# Patient Record
Sex: MALE | Race: WHITE
[De-identification: names, ages, dates, MRNs, and addresses within clinical notes are randomized per-mention and may not be internally consistent; named-entity substitution may affect disease eponyms.]

---

## 2019-04-10 ENCOUNTER — HOSPITAL ENCOUNTER (INPATIENT)
Dept: HOSPITAL 74 - JER | Age: 74
LOS: 6 days | Discharge: HOME | DRG: 897 | End: 2019-04-16
Attending: INTERNAL MEDICINE | Admitting: INTERNAL MEDICINE
Payer: COMMERCIAL

## 2019-04-10 VITALS — BODY MASS INDEX: 30.1 KG/M2

## 2019-04-10 DIAGNOSIS — R50.9: ICD-10-CM

## 2019-04-10 DIAGNOSIS — G47.30: ICD-10-CM

## 2019-04-10 DIAGNOSIS — R25.1: ICD-10-CM

## 2019-04-10 DIAGNOSIS — K21.9: ICD-10-CM

## 2019-04-10 DIAGNOSIS — E11.22: ICD-10-CM

## 2019-04-10 DIAGNOSIS — M17.11: ICD-10-CM

## 2019-04-10 DIAGNOSIS — K70.30: ICD-10-CM

## 2019-04-10 DIAGNOSIS — I12.9: ICD-10-CM

## 2019-04-10 DIAGNOSIS — M84.48XA: ICD-10-CM

## 2019-04-10 DIAGNOSIS — E11.21: ICD-10-CM

## 2019-04-10 DIAGNOSIS — E66.9: ICD-10-CM

## 2019-04-10 DIAGNOSIS — F17.210: ICD-10-CM

## 2019-04-10 DIAGNOSIS — E78.5: ICD-10-CM

## 2019-04-10 DIAGNOSIS — D61.818: ICD-10-CM

## 2019-04-10 DIAGNOSIS — R16.1: ICD-10-CM

## 2019-04-10 DIAGNOSIS — R56.9: ICD-10-CM

## 2019-04-10 DIAGNOSIS — F10.239: Primary | ICD-10-CM

## 2019-04-10 DIAGNOSIS — N18.9: ICD-10-CM

## 2019-04-10 LAB
ALBUMIN SERPL-MCNC: 3.4 G/DL (ref 3.4–5)
ALP SERPL-CCNC: 182 U/L (ref 45–117)
ALT SERPL-CCNC: 35 U/L (ref 13–61)
ANION GAP SERPL CALC-SCNC: 8 MMOL/L (ref 8–16)
APPEARANCE UR: CLEAR
AST SERPL-CCNC: 104 U/L (ref 15–37)
BACTERIA #/AREA URNS HPF: 2 /HPF
BASOPHILS # BLD: 0.3 % (ref 0–2)
BILIRUB SERPL-MCNC: 0.9 MG/DL (ref 0.2–1)
BILIRUB UR STRIP.AUTO-MCNC: NEGATIVE MG/DL
BUN SERPL-MCNC: 17 MG/DL (ref 7–18)
CALCIUM SERPL-MCNC: 8.2 MG/DL (ref 8.5–10.1)
CASTS #/AREA URNS LPF: 1 /HPF (ref 0–8)
CHLORIDE SERPL-SCNC: 104 MMOL/L (ref 98–107)
CO2 SERPL-SCNC: 26 MMOL/L (ref 21–32)
COLOR UR: YELLOW
CREAT SERPL-MCNC: 2.4 MG/DL (ref 0.55–1.3)
DEPRECATED RDW RBC AUTO: 19.3 % (ref 11.9–15.9)
EOSINOPHIL # BLD: 0.3 % (ref 0–4.5)
EPITH CASTS URNS QL MICRO: 1 /HPF
GLUCOSE SERPL-MCNC: 115 MG/DL (ref 74–106)
HCT VFR BLD CALC: 33.3 % (ref 35.4–49)
HGB BLD-MCNC: 11.1 GM/DL (ref 11.7–16.9)
KETONES UR QL STRIP: (no result)
LEUKOCYTE ESTERASE UR QL STRIP.AUTO: NEGATIVE
LYMPHOCYTES # BLD: 22.6 % (ref 8–40)
MAGNESIUM SERPL-MCNC: 1.7 MG/DL (ref 1.8–2.4)
MCH RBC QN AUTO: 29.9 PG (ref 25.7–33.7)
MCHC RBC AUTO-ENTMCNC: 33.3 G/DL (ref 32–35.9)
MCV RBC: 89.7 FL (ref 80–96)
MONOCYTES # BLD AUTO: 8.8 % (ref 3.8–10.2)
NEUTROPHILS # BLD: 68 % (ref 42.8–82.8)
NITRITE UR QL STRIP: NEGATIVE
PH UR: 6 [PH] (ref 5–8)
PLATELET # BLD AUTO: 61 K/MM3 (ref 134–434)
PMV BLD: 8.2 FL (ref 7.5–11.1)
POTASSIUM SERPLBLD-SCNC: 4 MMOL/L (ref 3.5–5.1)
PROT SERPL-MCNC: 7.3 G/DL (ref 6.4–8.2)
PROT UR QL STRIP: (no result)
PROT UR QL STRIP: NEGATIVE
RBC # BLD AUTO: 1 /HPF (ref 0–4)
RBC # BLD AUTO: 3.72 M/MM3 (ref 4–5.6)
SODIUM SERPL-SCNC: 138 MMOL/L (ref 136–145)
SP GR UR: 1.01 (ref 1.01–1.03)
UROBILINOGEN UR STRIP-MCNC: 0.2 MG/DL (ref 0.2–1)
WBC # BLD AUTO: 3 K/MM3 (ref 4–10)
WBC # UR AUTO: 1 /HPF (ref 0–5)

## 2019-04-10 PROCEDURE — HZ2ZZZZ DETOXIFICATION SERVICES FOR SUBSTANCE ABUSE TREATMENT: ICD-10-PCS

## 2019-04-10 RX ADMIN — INSULIN ASPART SCH: 100 INJECTION, SOLUTION INTRAVENOUS; SUBCUTANEOUS at 22:53

## 2019-04-10 RX ADMIN — HEPARIN SODIUM SCH UNIT: 5000 INJECTION, SOLUTION INTRAVENOUS; SUBCUTANEOUS at 22:23

## 2019-04-10 NOTE — PDOC
Attending Attestation





- HPI


HPI: 





04/10/19 17:02


The patient is a 73-year-old male, with a past medical history of HTN, HLD, DM, 

acid reflux, seizures, who presents to the ED for alcohol withdrawal. Wife is 

at bedside and states that the  patient has been drinking around 2 bottles of 

wine daily for the past month, but stopped drinking 2 days ago. He is currently 

involved in an outpatient detox program, but has not been attending for a month 

now. Patient is now experiencing tremors. Patient is a poor historian, wife is 

providing history.





The patient denies any fevers, chills, nausea, vomiting, diarrhea, or abdominal 

pain. Denies any chest pain, palpitations, or shortness of breath. 





Allergies: NKA


Surgical History: B/L inguinal hernia repair. 


Social History: Alcohol abuse. Denies tobacco or drug use. 


PCP is in the [place]. 








- Physicial Exam


PE: 





04/10/19 17:04


GENERAL: (+)Tremulous. Patient is a poor historian, wife is providing the 

history. Awake, alert, and fully oriented, in no acute distress


HEAD: No signs of trauma


EYES: PERRLA, EOMI, sclera anicteric, conjunctiva clear


ENT: (+)Tongue fasciculations. Auricles normal inspection, hearing grossly 

normal, nares patent, oropharynx clear without 


exudates. Moist mucosa


NECK: Normal ROM, supple, no lymphadenopathy, JVD, or masses


LUNGS: Breath sounds equal, clear to auscultation bilaterally.  No wheezes, and 

no crackles


HEART:  (+)Tachy. Normal S1 and S2, no murmurs, rubs or gallops


ABDOMEN: Soft, nontender, normoactive bowel sounds.  No guarding, no rebound.  

No masses


EXTREMITIES: Pedal and radial pulses are intact. Normal range of motion, no 

edema.  No clubbing or cyanosis. No cords, erythema, or tenderness


NEUROLOGICAL: Cranial nerves II through XII grossly intact.  Normal speech.


SKIN: Warm, Dry, normal turgor, no rashes or lesions noted











<Shandra Levin - Last Filed: 04/10/19 17:02>





- Resident


Resident Name: Camila Mayes





- ED Attending Attestation


I have performed the following: I have examined & evaluated the patient, The 

case was reviewed & discussed with the resident, I agree w/resident's findings 

& plan, Exceptions are as noted





- Medical Decision Making





04/10/19 16:07








I, Dr. Lili Melgar, DO, attest that this document has been prepared under 

my direction and personally reviewed by me in its entirety.   I further attest, 

that it accurately reflects all work, treatment, procedures and medical decision

-making performed by me.  


04/10/19 16:33


a/p: 74yo male with hx of alcohol abuse who has been drinking wine 1-2 bottles 

daily x 1 month


-stopped drinking 2 days ago, today with tongue fasciculations/tremors


-no external signs of trauma, denies falls


-has outpt detox program that he can return to after he detoxes from alcohol


-pt tachy, active withdrawal


-will check labs, ekg, cxr, mag


-will give thiamine and folate, mvi


04/10/19 16:35


pt PMD is in the [facility]


04/10/19 18:44


resident discussed the case with SYMPHONY who accepts pt to service


04/10/19 18:44


mag low, will replace


pt has a nephrologist, no prior cr, ivf hydration running


04/10/19 18:44


pt still with mild tongue fasciculations and tremors, but improved





<Lili Melgar - Last Filed: 04/10/19 18:45>





**Heart Score/ECG Review





- ECG Intrepretation


Comment:: 





04/10/19 17:18


sinus at 97, nl axis, nl interval, no acute st/t wave finding





<Lili Melgar - Last Filed: 04/10/19 18:45>





Attestations





- Attestations





04/10/19 17:09





Documentation prepared by Shandra Levin, acting as medical scribe for Lili Melgar DO.





<Shandra Levin - Last Filed: 04/10/19 17:02>

## 2019-04-10 NOTE — PN
Teaching Attending Note


Name of Resident: Roel Briones





ATTENDING PHYSICIAN STATEMENT





I saw and evaluated the patient.


I reviewed the resident's note and discussed the case with the resident.


I agree with the resident's findings and plan as documented.








SUBJECTIVE:





Patient is a 73-year-old man with a PMH of HTN, Tobacco use, HLD, DM, acid 

reflux, inguinal hernai repair and seizures, who presents to the ED for alcohol 

withdrawal. Was sent by his neurologist who noticed the tremors. Wife is at 

bedside and states that the patient has been drinking around 2 bottles of wine 

daily for the past month, but stopped drinking 2 days ago. He is currently 

involved in an outpatient detox program, but has not been attending for a month 

now. Patient is now experiencing tremors. Patient is a poor historian, wife is 

providing history. The patient denies any fevers, chills, nausea, vomiting, 

diarrhea, or abdominal pain. Denies any chest pain, palpitations, or shortness 

of breath. 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





OBJECTIVE:


Alert and tremulous


 Vital Signs











 Period  Temp  Pulse  Resp  BP Sys/Nieves  Pulse Ox


 


 Last 24 Hr  98.6 F-98.8 F  91-98  16-20  144-147/72-77  96-97








HEENT: No Jaundice, eye redness or discharge, PERRLA, EOMI. Tongue 

fasciculations. Normocephalic, atraumatic. External ears are normal and hearing 

is grossly intact. No nasal discharge.


Neck: Supple, nontender. No palpable adenopathy or thyromegaly. No JVD


Chest: Good effort. Clear to auscultation and percussion.


Heart: Regular. No S3, rub or murmur


Abdomen: Not distended, soft, nontender and no HSM. No rebound or guarding.  

Normal bowel sounds.


Ext: Peripheral pulses intact. No leg edema.


Skin: Warm and dry. No petechiae, rash or ecchymosis.


Neuro: Alert. Oriented x3. CN 2-12 grossly intact. Tremors. Sensation grossly 

intact in all four extremities and DTR are symmetric.


Psych: Appropriate mood and affect. Good insight.


 Current Medications











Generic Name Dose Route Start Last Admin





  Trade Name Freq  PRN Reason Stop Dose Admin


 


Amlodipine Besylate  2.5 mg  04/11/19 10:00  





  Norvasc -  PO   





  DAILY SAMUEL   





     





     





     





     


 


Atorvastatin Calcium  20 mg  04/10/19 22:00  





  Lipitor -  PO   





  HS On license of [facility]   





     





     





     





     


 


Folic Acid  1 mg  04/11/19 10:00  





  Folic Acid -  PO   





  DAILY On license of [facility]   





     





     





     





     


 


Heparin Sodium (Porcine)  5,000 unit  04/10/19 22:00  





  Heparin -  SQ   





  TID On license of [facility]   





     





     





     





     


 


Folic Acid 1 mg/ Thiamine HCl  1,000 mls @ 125 mls/hr  04/10/19 16:03  04/10/19 

17:00





100 mg/ Multivitamins/Minerals  IVPB  04/11/19 00:02  125 mls/hr





10 ml/ Sodium Chloride  ONCE ONE   Administration





     





     





     





     


 


Insulin Aspart  1 vial  04/10/19 22:00  





  Novolog Vial Sliding Scale -  SQ   





  ACHS On license of [facility]   





     





     





  Protocol   





     


 


Lorazepam  0.5 mg  04/12/19 05:00  





  Ativan -  PO  04/13/19 05:01  





  Q6H On license of [facility]   





     





     





     





     


 


Lorazepam  0.5 mg  04/12/19 05:00  





  Ativan -  PO  04/13/19 05:00  





  Q4H PRN   





  Symptoms of Withdrawal   





     





     





     


 


Lorazepam  1 mg  04/11/19 05:00  





  Ativan -  PO  04/11/19 23:01  





  0500,1100,1700,2300 On license of [facility]   





     





     





     





     


 


Lorazepam  1 mg  04/10/19 19:24  





  Ativan -  PO  04/12/19 05:00  





  Q4H PRN   





  Symptoms of Withdrawal   





     





     





     


 


Metoprolol Succinate  50 mg  04/11/19 10:00  





  Toprol Xl -  PO   





  DAILY On license of [facility]   





     





     





     





     


 


Non-Formulary Medication  40 mg  04/11/19 10:00  





  Esomeprazole Magnesium [Esomeprazole Magnesium]  PO   





  DAILY On license of [facility]   





     





     





     





     


 


Non-Formulary Medication  600 mg  04/11/19 10:00  





  Gabapentin [Gabapentin]  PO   





  DAILY On license of [facility]   





     





     





     





     


 


Non-Formulary Medication  1 each  04/11/19 10:00  





  Lipase/Protease/Amylase [Creon Dr 24,000 Units Capsule]  PO   





  DAILY On license of [facility]   





     





     





     





     


 


Non-Formulary Medication  20 mg  04/11/19 10:00  





  Tadalafil [Cialis]  PO   





  DAILY On license of [facility]   





     





     





     





     


 


Non-Formulary Medication  10 mg  04/10/19 22:00  





  Zolpidem Tartrate [Ambien]  PO   





  HS On license of [facility]   





     





     





     





     


 


Spironolactone  25 mg  04/11/19 10:00  





  Aldactone -  PO   





  DAILY On license of [facility]   





     





     





     





     


 


Thiamine HCl  100 mg  04/11/19 10:00  





  Vitamin B1 -  PO   





  DAILY On license of [facility]   





     





     





     





     








 Home Medications











 Medication  Instructions  Recorded


 


Amlodipine Besylate 2.5 mg PO DAILY 04/10/19


 


Atorvastatin Ca [Lipitor] 20 mg PO HS 04/10/19


 


Esomeprazole Magnesium 40 mg PO DAILY 04/10/19


 


Gabapentin 600 mg PO DAILY 04/10/19


 


Insulin Degludec [Tresiba] 40 unit SQ BID 04/10/19


 


Lipase/Protease/Amylase [Creon Dr 1 each PO DAILY 04/10/19





24,000 Units Capsule]  


 


Liraglutide [Victoza -] 1.8 mg SQ DAILY@0700 04/10/19


 


Metformin HCl [Metformin HCl ER] 500 mg PO QID 04/10/19


 


Metoprolol Succinate [Toprol Xl] 50 mg PO DAILY 04/10/19


 


Spironolactone 25 mg PO DAILY 04/10/19


 


Tadalafil [Cialis] 20 mg PO DAILY 04/10/19


 


Zolpidem Tartrate [Ambien] 10 mg PO HS 04/10/19








 Abnormal Lab Results











  04/10/19 04/10/19





  16:43 16:43


 


WBC  3.0 L 


 


RBC  3.72 L 


 


Hgb  11.1 L 


 


Hct  33.3 L 


 


RDW  19.3 H 


 


Plt Count  61 L 


 


Creatinine   2.4 H


 


Random Glucose   115 H


 


Calcium   8.2 L


 


Magnesium   1.7 L


 


AST   104 H


 


Alkaline Phosphatase   182 H


 


Serum Folate   33 H








ASSESSMENT AND PLAN:


1. Alcohol withdrawal syndrome - Implement CIWA ativan alcohol withdrawal 

protocol. Do neurocheaks and implement seizure, fall and aspiration 

precautions. Treat with thiamine and folic acid and monitor electrolytes (Ca,Mg,

K,P). Counseled patient about abstaining from alcohol and will refer to alcohol 

detox upon discharge. Consult Addiction specialist. Getting banana bag in the 

ER.





EKG is NSR with not ST-T wave changes. CXR shows mild cardiomegaly with right 

hilar prominence and a RUL pleural based mass. Will get a Chest CT in view of 

his risk factors for lung cancer.





2. DM  For now, we will hold the home diabetes drugs and implement sliding 

scale insulin regimen. Provide comprehensive diabetes care with patient 

teaching and counseling about the importance of adherence to prescribed 

diabetes regimen, euglycemia, eye care and foot care.





3. Tobacco Use  Counseled on risks associated with tobacco use.  We will 

provide patient all the necessary assistance to facilitate smoking cessation 

and prescribe Nicotine patch.





4. CKD - Has risk factors for CKD. Says he sees a nephrologist in the [place]. 

Will get urinalysis, a kidney sonogram, PTH, phosphate and hydrate gently. 

Consult nephrology and avoid nephrotoxic agents such as NSAIDS, aminoglycosides

, contrast dyes and certain Alternative medicine products.





5. Pancytopenia - Likely due to effects of alcohol. Do basic anemia work up 

including serial stool guaiacs, reticulocyte count and iron studies. Monitor 

platelets daily. Abdominal sonogram pending.





6. Obesity  Counseled on the risks associated with obesity. Will provide 

patient all the necessary assistance, counseling and positive reinforcement to 

facilitate weight loss. Consult dietician.





7. Hypertension - Restart outpatient antihypertensive drugs and revise regimen 

to ensure smooth round-the-clock good BP control.  Nonpharmacologic measures to 

control hypertension like weight loss, salt restriction and exercise discussed.





8. DVT prophylaxis - Heparin 5000u sq tid; SCDs, TEDs (Monitor platelets daily 

and if it drops lower than the current level, will hold SQ heparin).   





9. Advance directives - Full code

## 2019-04-10 NOTE — PDOC
History of Present Illness





- General


Chief Complaint: Alcohol intoxication


Stated Complaint: PATIENT HERE FOR WITHDRAWAL


Time Seen by Provider: 04/10/19 15:59


History Source: Patient


Exam Limitations: No Limitations





- History of Present Illness


Initial Comments: 





04/10/19 16:01


73 year old man with a history of alcohol abuse, sleep apnea, DM2, HTN, alcohol 

withdrawal seizures, last detox Dec 2018 who presents with  withdrawal after 

drinking 1-2 bottles of wine per day for approx one month and last drink 2 days 

ago. The patient is followed by outpatient detox at [facility]. 

The patient denies any headache, chest pain, n/v/d/c, fever. Denies any recent 

seizures. 





PCP: Dr. Burleson











Past History





- Past Medical History


Allergies/Adverse Reactions: 


 Allergies











Allergy/AdvReac Type Severity Reaction Status Date / Time


 


No Known Allergies Allergy   Verified 06/11/14 21:18











Home Medications: 


Ambulatory Orders





Amlodipine Besylate 2.5 mg PO DAILY 04/10/19 


Atorvastatin Ca [Lipitor] 20 mg PO HS 04/10/19 


Ciclopirox Olamine [Ciclopirox] 15 gm TP DAILY 04/10/19 


Clotrimazole/Betamethasone Dip [Clotrimazole-Betamethasone Crm] 45 gm TP DAILY 

04/10/19 


Esomeprazole Magnesium 40 mg PO DAILY 04/10/19 


Gabapentin 600 mg PO DAILY 04/10/19 


Insulin Degludec [Tresiba] 180 unit SQ DAILY 04/10/19 


Lipase/Protease/Amylase [Creon Dr 24,000 Units Capsule] 1 each PO DAILY 04/10/

19 


Liraglutide [Victoza -] 1.8 mg SQ DAILY@0700 04/10/19 


Metformin HCl [Metformin HCl ER] 500 mg PO QID 04/10/19 


Metoprolol Succinate [Toprol Xl] 50 mg PO DAILY 04/10/19 


Spironolactone 25 mg PO DAILY 04/10/19 


Tadalafil [Cialis] 20 mg PO DAILY 04/10/19 


Zolpidem Tartrate [Ambien] 10 mg PO HS 04/10/19 








Anemia: No


Asthma: No


Cancer: No


Cardiac Disorders: No


CVA: No


COPD: No


CHF: No


Dementia: No


Diabetes: Yes (on jnuvia)


GI Disorders: Yes (acid reflux disease)


 Disorders: No


HTN: Yes (on medication)


Hypercholesterolemia: Yes (on lipitor 20 mgs po hs)


Kidney Stones: No


Liver Disease: No


Seizures: Yes (2 years ago)


Thyroid Disease: No





- Surgical History


Abdominal Surgery: Yes (bilat inguinal hernia repair age age of 10 years old amd 

16 years old)





- Immunization History


Immunization Up to Date: No





- Suicide/Smoking/Psychosocial Hx


Smoking History: Never smoked


Have you smoked in the past 12 months: No


Information on smoking cessation initiated: No


Hx Alcohol Use: Yes (2 DAYS AGO)


Drug/Substance Use Hx: No


Substance Use Type: Alcohol


Hx Substance Use Treatment: Yes (2012)





**Review of Systems





- Review of Systems


Able to Perform ROS?: Yes


Comments:: 





04/10/19 17:25


GENERAL/CONSTITUTIONAL: No fever or chills. No weakness.


HEAD, EYES, EARS, NOSE AND THROAT: No change in vision. No ear pain or 

discharge. No sore throat.


CARDIOVASCULAR: No chest pain or shortness of breath


RESPIRATORY: No cough, wheezing, or hemoptysis.


GASTROINTESTINAL: No nausea, vomiting, diarrhea or constipation.


GENITOURINARY: No dysuria, frequency, or change in urination.


MUSCULOSKELETAL: No joint or muscle swelling or pain. No neck or back pain.


SKIN: No rash


NEUROLOGIC: No headache, vertigo, loss of consciousness, or change in strength/

sensation.


ENDOCRINE: No increased thirst. No abnormal weight change


HEMATOLOGIC/LYMPHATIC: No anemia, easy bleeding, or history of blood clots.


ALLERGIC/IMMUNOLOGIC: No hives or skin allergy.








Is the patient limited English proficient: No





*Physical Exam





- Vital Signs


 Last Vital Signs











Temp Pulse Resp BP Pulse Ox


 


 98.6 F   91 H  16   144/72   97 


 


 04/10/19 14:06  04/10/19 14:06  04/10/19 14:06  04/10/19 14:06  04/10/19 14:06














- Physical Exam


Comments: 





04/10/19 17:24


GENERAL: Awake, alert, and fully oriented, in no acute distress


HEAD: No signs of trauma, normocephalic, atraumatic 


EYES: EOMI, sclera anicteric, conjunctiva clear


ENT: + tongue fasciculations, oropharynx clear without exudates. Moist mucosa


NECK: Normal ROM, supple


LUNGS: No distress, speaks full sentences, clear to auscultation bilaterally 


HEART: Regular rate and rhythm, normal S1 and S2, no murmurs, rubs or gallops, 

peripheral pulses normal and equal bilaterally. 


ABDOMEN: Soft, nontender, normoactive bowel sounds.  No guarding, no rebound.  

No masses


EXTREMITIES : Normal inspection, Normal range of motion, no edema.  No clubbing 

or cyanosis. 


NEUROLOGICAL: Cranial nerves II through XII grossly intact.  Normal speech, no 

focal sensorimotor deficits + hand tremors


SKIN: Warm, Dry, normal turgor, no rashes or lesions noted











ED Treatment Course





- LABORATORY


CBC & Chemistry Diagram: 


 04/10/19 16:43





 04/10/19 16:43





Medical Decision Making





- Medical Decision Making





04/10/19 17:21


73 year old man with a history of alcohol abuse, sleep apnea, DM2, HTN, alcohol 

withdrawal seizures, last detox Dec 2018 who presents with  withdrawal after 

drinking 1-2 bottles of wine per day for approx one month and last drink 2 days 

ago.





ED Course:


presenting with withdarwal symptosm seeking detox. 


will evalaute for hypoglycemia, electrolyte derange, ekg changes 





EKG: normal sinus 97bpm, short FL 110ms, no interval abnormalities, narrow QRS, 

ST and T wave segments and morphology normal.





pending labwork





04/10/19 18:22


Patient reassessed, still with hand tremors and tongue fisiculations


cbc, cmp wnl





Cr elevatd no prior lab value, the patient follows with a nephorlogist 





04/10/19 18:33


Discusssed case with inapatient medicine team .





CXR read as R pleural mass


will order CT chest 











*DC/Admit/Observation/Transfer


Diagnosis at time of Disposition: 


 Alcohol withdrawal








- Discharge Dispostion


Condition at time of disposition: Stable


Decision to Admit order: Yes





- Referrals


Referrals: 


ON STAFF,NOT [Primary Care Provider] - 





- Patient Instructions





- Post Discharge Activity

## 2019-04-10 NOTE — HP
CHIEF COMPLAINT: detox





HISTORY OF PRESENT ILLNESS:





73 year old male hx alcohol abuse, sleep apnea, non-insulin dependent diabetes, 

HTN, alcohol withdrawal seizure (only one in the past, last seizure 3 years ago)

, and multiple detox admissions (2018) presents for detox sent by one of his 

outpatient physicians. He reports that his last drink was 2 days ago and that 

he can drink up to 1 pint in a day. He does not want to go to his normal 

outpatient detox facility and does not wish to go to [facility] for detox. 

Patient reports withdrawal symptoms being tremors in his fingers and tongue 

fasciculations. He reports that he currently feels at ease, denies chest pain, 

shortness of breath, palpitations, abdominal pain, fevers, chills, headache, 

nausea, vomiting, diarrhea. Reports he sees a nephrologist Dr. Haile Del Cid in 

the [place] ([phone]) for a "kidney problem" but is unable to tell me his 

baseline creatinine or what his kidney disorder is. Reports that he sees a 

liver doctor as well, but was unable to tell me his information at the time of 

exam.





ER course was notable for:


(1) /72


(2) pancytopenia


(3) Cre 2.4





Recent Travel: denies





PAST MEDICAL HISTORY: alcohol abuse, sleep apnea, non-insulin dependent diabetes

, HTN, alcohol withdrawal seizure





PAST SURGICAL HISTORY: hernia repair as a child





Social History:


Smoking: former, quit 30 years ago, smoked since he was 14 1 ppd


Alcohol: heavy alcohol user for many years, last drink 2 days ago


Drugs: denies





Family History: father and 2 brothers  of lung CA, mother had alzheimers, 

children and grandchildren are all healthy


Occupation: former  exposed to asbestos





Allergies





No Known Allergies Allergy (Verified 14 21:18)


 








HOME MEDICATIONS:


 Home Medications











 Medication  Instructions  Recorded


 


Amlodipine Besylate 2.5 mg PO DAILY 04/10/19


 


Atorvastatin Ca [Lipitor] 20 mg PO HS 04/10/19


 


Esomeprazole Magnesium 40 mg PO DAILY 04/10/19


 


Gabapentin 600 mg PO DAILY 04/10/19


 


Insulin Degludec [Tresiba] 40 unit SQ BID 04/10/19


 


Lipase/Protease/Amylase [Remigio Dr 1 each PO DAILY 04/10/19





24,000 Units Capsule]  


 


Liraglutide [Victoza -] 1.8 mg SQ DAILY@0700 04/10/19


 


Metformin HCl [Metformin HCl ER] 500 mg PO QID 04/10/19


 


Metoprolol Succinate [Toprol Xl] 50 mg PO DAILY 04/10/19


 


Spironolactone 25 mg PO DAILY 04/10/19


 


Tadalafil [Cialis] 20 mg PO DAILY 04/10/19


 


Zolpidem Tartrate [Ambien] 10 mg PO HS 04/10/19








REVIEW OF SYSTEMS


CONSTITUTIONAL: 


Absent:  fever, chills, diaphoresis, generalized weakness, malaise, loss of 

appetite, weight change


HEENT: 


Absent:  rhinorrhea, nasal congestion, throat pain, throat swelling, difficulty 

swallowing, mouth swelling, ear pain, eye pain, visual changes


CARDIOVASCULAR: 


Absent: chest pain, syncope, palpitations, irregular heart rate, lightheadedness

, peripheral edema


RESPIRATORY: 


Absent: cough, shortness of breath, dyspnea with exertion, orthopnea, wheezing, 

stridor, hemoptysis


GASTROINTESTINAL:


Absent: abdominal pain, abdominal distension, nausea, vomiting, diarrhea, 

constipation, melena, hematochezia


GENITOURINARY: 


Absent: dysuria, frequency, urgency, hesitancy, hematuria, flank pain, genital 

pain


MUSCULOSKELETAL: 


Absent: myalgia, arthralgia, joint swelling, back pain, neck pain


SKIN: 


Absent: rash, itching, pallor


HEMATOLOGIC/IMMUNOLOGIC: 


Absent: easy bleeding, easy bruising, lymphadenopathy, frequent infections


ENDOCRINE:


Absent: unexplained weight gain, unexplained weight loss, heat intolerance, 

cold intolerance


NEUROLOGIC: tremor


Absent: headache, focal weakness or paresthesias, dizziness, unsteady gait, 

seizure, mental status changes, bladder or bowel incontinence


PSYCHIATRIC: 


Absent: anxiety, depression, suicidal or homicidal ideation, hallucinations.








PHYSICAL EXAMINATION


 Vital Signs - 24 hr











  04/10/19 04/10/19 04/10/19





  14:06 19:23 19:26


 


Temperature 98.6 F  98.8 F


 


Pulse Rate 91 H  


 


Pulse Rate [   98 H





Radial]   


 


Respiratory 16  20





Rate   


 


Blood Pressure 144/72  


 


Blood Pressure   147/77





[Right Arm]   


 


O2 Sat by Pulse 97 97 96





Oximetry (%)   











GENERAL: A&Ox3, no acute distress


EYES: PERRLA, EOMI


ENT: Moist mucus membranes, very mild-no tongue fasciculation, no pharyngeal 

erythema


NECK: No JVD, no lymphadenopathy


LUNGS: CTA, no wheezes


HEART: mildly tachycardic, no murmurs


ABDOMEN: Soft, nontender, BS present


MUSCULOSKELETAL: No CVA Tenderness


EXTREMITIES: 2+ pulses, no edema. 


NEUROLOGICAL:  Cranial nerves II-XII intact. mild hand tremors noted. Motor 

strength 5/5 bilaterally, sensation intact.











 Laboratory Results - last 24 hr











  04/10/19 04/10/19





  16:43 16:43


 


WBC  3.0 L 


 


RBC  3.72 L 


 


Hgb  11.1 L 


 


Hct  33.3 L 


 


MCV  89.7 


 


MCH  29.9 


 


MCHC  33.3 


 


RDW  19.3 H 


 


Plt Count  61 L 


 


MPV  8.2 


 


Absolute Neuts (auto)  2.0 


 


Neutrophils %  68.0 


 


Lymphocytes %  22.6 


 


Monocytes %  8.8 


 


Eosinophils %  0.3 


 


Basophils %  0.3 


 


Nucleated RBC %  0 


 


Sodium   138


 


Potassium   4.0


 


Chloride   104


 


Carbon Dioxide   26


 


Anion Gap   8


 


BUN   17


 


Creatinine   2.4 H


 


Creat Clearance w eGFR   26.82


 


Random Glucose   115 H


 


Calcium   8.2 L


 


Magnesium   1.7 L


 


Total Bilirubin   0.9


 


AST   104 H


 


ALT   35


 


Alkaline Phosphatase   182 H


 


Total Protein   7.3


 


Albumin   3.4


 


Serum Folate   33 H





 





ASSESSMENT/PLAN:





73 year old male hx alcohol abuse, sleep apnea, non-insulin dependent diabetes, 

HTN, alcohol withdrawal seizure (only one in the past, last seizure 3 years ago)

, and multiple detox admissions (2018) presents for detox 





#Alcohol Withdrawal: patient has had last drink 2 days ago with a CIWA of ~1


-will start ativan protocol


-consult detox Dr. Chi


-janelle chavez ordered


-thiamine, folate ordered


-telemetry


-fall precautions


-ativan detox protocol


-LR @ 83cc/hr 1 bag





#Kidney Disease: unclear if acute or chronic at the moment, will need records 

from outpatient nephrologist


-outpatient nephrologist name is Dr. Haile Del Cid at [phone]


-ordered kidney US


-UA ordered


-continue spironolactone





#Pancytopenia: likely 2/2 alcohol use disorder and hepatic congestion/liver 

cirrhosis


-US ordered liver and spleen


-monitor H&H in AM


-may need further workup of pancytopenia


-will need to have meds confirmed in AM - but on creon and esomeprazole





#Pleural Lesion: seen on Xray


-will get non-con CT to evaluate lung further





#DM: sugars are controlled


-will get A1C for records


-sliding scale and levemir 40subQ BID ordered


-held home meds


-BGM ACHS





#Hypertension: mildly hypertensive now, could be as a result of withdrawal


-continue metoprolol succinate 50 daily


-continue spironolactone 25 qd


-continue amlodipine 2.5 qd





#Hyperlipidemia: chronic


-continue statin





#FEN


-LR @ 83cc/hr


-replete mag, recheck lytes in AM


-diabetic diet





#Prophylaxis


-heparin prophylaxis





#Disposition


-admit telemetry





Visit type





- Emergency Visit


Emergency Visit: Yes


ED Registration Date: 04/10/19


Care time: The patient presented to the Emergency Department on the above date 

and was hospitalized for further evaluation of their emergent condition.





- New Patient


This patient is new to me today: Yes


Date on this admission: 04/10/19





- Critical Care


Critical Care patient: No

## 2019-04-11 LAB
ALBUMIN SERPL-MCNC: 3.1 G/DL (ref 3.4–5)
ALP SERPL-CCNC: 169 U/L (ref 45–117)
ALT SERPL-CCNC: 31 U/L (ref 13–61)
ANION GAP SERPL CALC-SCNC: 5 MMOL/L (ref 8–16)
AST SERPL-CCNC: 86 U/L (ref 15–37)
BILIRUB SERPL-MCNC: 0.9 MG/DL (ref 0.2–1)
BUN SERPL-MCNC: 17 MG/DL (ref 7–18)
CALCIUM SERPL-MCNC: 8 MG/DL (ref 8.5–10.1)
CHLORIDE SERPL-SCNC: 107 MMOL/L (ref 98–107)
CO2 SERPL-SCNC: 27 MMOL/L (ref 21–32)
CREAT SERPL-MCNC: 2.1 MG/DL (ref 0.55–1.3)
DEPRECATED RDW RBC AUTO: 19 % (ref 11.9–15.9)
GLUCOSE SERPL-MCNC: 85 MG/DL (ref 74–106)
HCT VFR BLD CALC: 30.1 % (ref 35.4–49)
HGB BLD-MCNC: 10.1 GM/DL (ref 11.7–16.9)
MAGNESIUM SERPL-MCNC: 1.8 MG/DL (ref 1.8–2.4)
MCH RBC QN AUTO: 30.5 PG (ref 25.7–33.7)
MCHC RBC AUTO-ENTMCNC: 33.7 G/DL (ref 32–35.9)
MCV RBC: 90.5 FL (ref 80–96)
PHOSPHATE SERPL-MCNC: 3 MG/DL (ref 2.5–4.9)
PLATELET # BLD AUTO: 50 K/MM3 (ref 134–434)
PMV BLD: 8.7 FL (ref 7.5–11.1)
POTASSIUM SERPLBLD-SCNC: 4 MMOL/L (ref 3.5–5.1)
PROT SERPL-MCNC: 6.7 G/DL (ref 6.4–8.2)
RBC # BLD AUTO: 3.33 M/MM3 (ref 4–5.6)
SODIUM SERPL-SCNC: 139 MMOL/L (ref 136–145)
WBC # BLD AUTO: 2.7 K/MM3 (ref 4–10)

## 2019-04-11 RX ADMIN — INSULIN ASPART SCH: 100 INJECTION, SOLUTION INTRAVENOUS; SUBCUTANEOUS at 12:31

## 2019-04-11 RX ADMIN — INSULIN ASPART SCH: 100 INJECTION, SOLUTION INTRAVENOUS; SUBCUTANEOUS at 17:40

## 2019-04-11 RX ADMIN — HEPARIN SODIUM SCH UNIT: 5000 INJECTION, SOLUTION INTRAVENOUS; SUBCUTANEOUS at 06:25

## 2019-04-11 RX ADMIN — INSULIN DETEMIR SCH UNITS: 100 INJECTION, SOLUTION SUBCUTANEOUS at 22:33

## 2019-04-11 RX ADMIN — INSULIN ASPART SCH: 100 INJECTION, SOLUTION INTRAVENOUS; SUBCUTANEOUS at 06:30

## 2019-04-11 RX ADMIN — ATORVASTATIN CALCIUM SCH MG: 20 TABLET, FILM COATED ORAL at 22:34

## 2019-04-11 RX ADMIN — INSULIN ASPART SCH UNITS: 100 INJECTION, SOLUTION INTRAVENOUS; SUBCUTANEOUS at 22:35

## 2019-04-11 RX ADMIN — ERYTHROMYCIN SCH APPLIC: 5 OINTMENT OPHTHALMIC at 22:33

## 2019-04-11 NOTE — EKG
Test Reason : 

Blood Pressure : ***/*** mmHG

Vent. Rate : 097 BPM     Atrial Rate : 097 BPM

   P-R Int : 110 ms          QRS Dur : 078 ms

    QT Int : 340 ms       P-R-T Axes : 069 014 030 degrees

   QTc Int : 431 ms

 

*** POOR DATA QUALITY, INTERPRETATION MAY BE ADVERSELY AFFECTED

SINUS RHYTHM WITH SHORT DE

CANNOT RULE OUT ANTERIOR INFARCT , AGE UNDETERMINED

ABNORMAL ECG

NO PREVIOUS ECGS AVAILABLE

Confirmed by SUNITA GARCIA, PA ([id number]) on 4/11/2019 4:37:38 PM

 

Referred By:             Confirmed By:PA DORSEY MD

## 2019-04-11 NOTE — PN
Physical Exam: 


SUBJECTIVE: Patient seen and examined at bedside this morning. 


Patient is a 73 year old male with past medical history of alcohol abuse, sleep 

apnea, DM, HTN, alcohol withdrawal seizure (last episode 3 years ago), and 

multiple detox admissions, brought in for detox by one of his outpatient 

doctors. Patient is joining in a study for dementia, where he was evaluated by 

a neurologist and was told he needs to go to a detox facility (last drink 2 

days ago) prior to starting any treatment, hence the admission. Of note, 

patient had a fall a few weeks ago due to intoxication. Today, patient denies 

any fever, chills, nausea, vomiting, chest pain, shortness of breath, 

palpitations, abdominal pain, urinary symptoms, diarrhea. 








OBJECTIVE:





 Vital Signs











Temperature  97.4 F L  04/11/19 13:42


 


Pulse Rate  73   04/11/19 13:42


 


Respiratory Rate  18   04/11/19 13:42


 


Blood Pressure  133/68   04/11/19 13:42


 


O2 Sat by Pulse Oximetry (%)  99   04/11/19 13:42








GENERAL: The patient is awake, alert, and fully oriented, in no acute distress.


HEAD: Normal with no signs of trauma.


EYES: PERRLA, EOMI, sclera anicteric, conjunctiva clear. 


ENT: oropharynx clear without exudates, moist mucous membranes.


NECK: Trachea midline, full range of motion, supple. 


LUNGS: Breath sounds equal, clear to auscultation bilaterally.


HEART: Regular rate and rhythm, S1, S2 without murmur, rub or gallop.


ABDOMEN: Soft, nontender, nondistended, normoactive bowel sounds.


EXTREMITIES: 2+ pulses, warm, well-perfused, no edema. 


NEUROLOGICAL: Cranial nerves II through XII grossly intact. Normal speech, 

normal gait. Motor strength 5/5, sensation intact. 


PSYCH: Normal mood, normal affect.


SKIN: Warm, dry, normal turgor, no rashes or lesions noted














 Laboratory Results - last 24 hr











  04/10/19 04/10/19 04/10/19





  16:43 16:43 22:15


 


WBC  3.0 L  


 


RBC  3.72 L  


 


Hgb  11.1 L  


 


Hct  33.3 L  


 


MCV  89.7  


 


MCH  29.9  


 


MCHC  33.3  


 


RDW  19.3 H  


 


Plt Count  61 L  


 


MPV  8.2  


 


Absolute Neuts (auto)  2.0  


 


Neutrophils %  68.0  


 


Lymphocytes %  22.6  


 


Monocytes %  8.8  


 


Eosinophils %  0.3  


 


Basophils %  0.3  


 


Nucleated RBC %  0  


 


Sodium   138 


 


Potassium   4.0 


 


Chloride   104 


 


Carbon Dioxide   26 


 


Anion Gap   8 


 


BUN   17 


 


Creatinine   2.4 H 


 


Creat Clearance w eGFR   26.82 


 


POC Glucometer   


 


Random Glucose   115 H 


 


Hemoglobin A1c %   


 


Calcium   8.2 L 


 


Phosphorus   


 


Magnesium   1.7 L 


 


Total Bilirubin   0.9 


 


AST   104 H 


 


ALT   35 


 


Alkaline Phosphatase   182 H 


 


Total Protein   7.3 


 


Albumin   3.4 


 


Serum Folate   33 H 


 


Urine Color    Yellow


 


Urine Appearance    Clear


 


Urine pH    6.0


 


Ur Specific Gravity    1.014


 


Urine Protein    1+ H


 


Urine Glucose (UA)    Negative


 


Urine Ketones    Trace H


 


Urine Blood    Trace


 


Urine Nitrite    Negative


 


Urine Bilirubin    Negative


 


Urine Urobilinogen    0.2


 


Ur Leukocyte Esterase    Negative


 


Urine WBC (Auto)    1


 


Urine RBC (Auto)    1


 


Urine Casts (Auto)    1


 


U Epithel Cells (Auto)    1.0


 


Urine Bacteria (Auto)    2.0














  04/10/19 04/11/19 04/11/19





  22:45 05:30 05:30


 


WBC   2.7 L 


 


RBC   3.33 L 


 


Hgb   10.1 L 


 


Hct   30.1 L 


 


MCV   90.5 


 


MCH   30.5 


 


MCHC   33.7 


 


RDW   19.0 H 


 


Plt Count   50 L 


 


MPV   8.7 


 


Absolute Neuts (auto)   


 


Neutrophils %   


 


Lymphocytes %   


 


Monocytes %   


 


Eosinophils %   


 


Basophils %   


 


Nucleated RBC %   


 


Sodium    139


 


Potassium    4.0


 


Chloride    107


 


Carbon Dioxide    27


 


Anion Gap    5 L


 


BUN    17


 


Creatinine    2.1 H


 


Creat Clearance w eGFR    31.29


 


POC Glucometer  137  


 


Random Glucose    85


 


Hemoglobin A1c %   


 


Calcium    8.0 L


 


Phosphorus    3.0


 


Magnesium    1.8


 


Total Bilirubin    0.9


 


AST    86 H


 


ALT    31


 


Alkaline Phosphatase    169 H


 


Total Protein    6.7


 


Albumin    3.1 L


 


Serum Folate   


 


Urine Color   


 


Urine Appearance   


 


Urine pH   


 


Ur Specific Gravity   


 


Urine Protein   


 


Urine Glucose (UA)   


 


Urine Ketones   


 


Urine Blood   


 


Urine Nitrite   


 


Urine Bilirubin   


 


Urine Urobilinogen   


 


Ur Leukocyte Esterase   


 


Urine WBC (Auto)   


 


Urine RBC (Auto)   


 


Urine Casts (Auto)   


 


U Epithel Cells (Auto)   


 


Urine Bacteria (Auto)   














  04/11/19 04/11/19 04/11/19





  05:30 06:28 12:19


 


WBC   


 


RBC   


 


Hgb   


 


Hct   


 


MCV   


 


MCH   


 


MCHC   


 


RDW   


 


Plt Count   


 


MPV   


 


Absolute Neuts (auto)   


 


Neutrophils %   


 


Lymphocytes %   


 


Monocytes %   


 


Eosinophils %   


 


Basophils %   


 


Nucleated RBC %   


 


Sodium   


 


Potassium   


 


Chloride   


 


Carbon Dioxide   


 


Anion Gap   


 


BUN   


 


Creatinine   


 


Creat Clearance w eGFR   


 


POC Glucometer   78  88


 


Random Glucose   


 


Hemoglobin A1c %  7.4 H  


 


Calcium   


 


Phosphorus   


 


Magnesium   


 


Total Bilirubin   


 


AST   


 


ALT   


 


Alkaline Phosphatase   


 


Total Protein   


 


Albumin   


 


Serum Folate   


 


Urine Color   


 


Urine Appearance   


 


Urine pH   


 


Ur Specific Gravity   


 


Urine Protein   


 


Urine Glucose (UA)   


 


Urine Ketones   


 


Urine Blood   


 


Urine Nitrite   


 


Urine Bilirubin   


 


Urine Urobilinogen   


 


Ur Leukocyte Esterase   


 


Urine WBC (Auto)   


 


Urine RBC (Auto)   


 


Urine Casts (Auto)   


 


U Epithel Cells (Auto)   


 


Urine Bacteria (Auto)   








Active Medications











Generic Name Dose Route Start Last Admin





  Trade Name Freq  PRN Reason Stop Dose Admin


 


Amlodipine Besylate  2.5 mg  04/12/19 10:00  





  Norvasc -  PO   





  DAILY [facility]   





     





     





     





     


 


Atorvastatin Calcium  20 mg  04/11/19 22:00  





  Lipitor -  PO   





  [facility]   





     





     





     





     


 


Folic Acid  1 mg  04/12/19 10:00  





  Folic Acid -  PO   





  DAILY [facility]   





     





     





     





     


 


Gabapentin  600 mg  04/12/19 10:00  





  Neurontin -  PO   





  DAILY [facility]   





     





     





     





     


 


Insulin Aspart  1 vial  04/11/19 16:30  





  Novolog Vial Sliding Scale -  SQ   





  [facility]   





     





     





  Protocol   





     


 


Insulin Detemir  40 units  04/11/19 22:00  





  Levemir Vial  SQ   





  BID@0700,2200 [facility]   





     





     





     





     


 


Lorazepam  0.5 mg  04/12/19 05:00  





  Ativan -  PO  04/13/19 05:00  





  Q4H PRN   





  Symptoms of Withdrawal   





     





     





     


 


Lorazepam  1 mg  04/11/19 12:01  





  Ativan -  PO  04/12/19 05:00  





  Q4H PRN   





  Symptoms of Withdrawal   





     





     





     


 


Lorazepam  0.5 mg  04/12/19 05:00  





  Ativan -  PO  04/13/19 05:01  





  Q6H [facility]   





     





     





     





     


 


Lorazepam  1 mg  04/11/19 17:00  





  Ativan -  PO  04/11/19 23:01  





  0500,1100,1700,2300 [facility]   





     





     





     





     


 


Metoprolol Succinate  50 mg  04/12/19 10:00  





  Toprol Xl -  PO   





  DAILY [facility]   





     





     





     





     


 


Non-Formulary Medication  1 each  04/12/19 10:00  





  Lipase/Protease/Amylase [Creon Dr 24,000 Units Capsule]  PO   





  DAILY [facility]   





     





     





     





     


 


Non-Formulary Medication  20 mg  04/12/19 10:00  





  Tadalafil [Cialis]  PO   





  DAILY [facility]   





     





     





     





     


 


Pantoprazole Sodium  40 mg  04/12/19 10:00  





  Protonix -  PO   





  DAILY [facility]   





     





     





     





     


 


Spironolactone  25 mg  04/12/19 10:00  





  Aldactone -  PO   





  [facility]ILY [facility]   





     





     





     





     


 


Thiamine HCl  100 mg  04/12/19 10:00  





  Vitamin B1 -  PO   





  [facility]ILY [facility]   





     





     





     





     


 


Zolpidem Tartrate  5 mg  04/11/19 12:01  





  Ambien -  PO   





  [facility]   





  [facility]   





     





     





     











ASSESSMENT/PLAN:


Patient is a 73 year old male with past medical history of alcohol abuse, sleep 

apnea, DM, HTN, alcohol withdrawal seizure (last episode 3 years ago), and 

multiple detox admissions, brought in for detox by one of his outpatient 

doctors.





#Alcohol Withdrawal


-last drink 3 days ago, CIWA 0


-Ativan protocol started.


-Dr. Chi consulted. Recommendations appreciated. 


-Thiamine 100mg and Folic acid 1 mg daily


-fall precautions


-seizure precaution





#Kidney Disease: likely chronic


-BUN/Cr 17/2.1


-Was able to contact Dr. Haile Del Cid's (nephrologist) office but he is 

unavailable. Previous records will be faxed.





#Pancytopenia: likely 2/2 alcohol use disorder and hepatic congestion/liver 

cirrhosis


-Abdominal US: Fatty liver vs HCD. Thickening of the gallbladder wall mainly at 

the fundus with suggestion of a small sludge layering posteriorly and without 

evidence of gallstones or pericholecystic free fluid. Mild splenomegaly. 


-will continue to monitor CBC


-Heparin ppx discontinued.


-may need further workup of pancytopenia





#Pleural Lesion on CXR


-chest CT: oval shaped pleural based mass like density in right midlung 4x14cm, 

suggestive of lipoma. otherwise, no acute lung disease or nodules. Slightly 

displaced acute fracture of right 10th-12th rib. 





#DM: insulin dependent


-HbA1c 7.4


-Levemir 40unit sq BID


-Insulin sliding scale 


-BGM ACHS


-will hold home meds





#Hypertension: chronic


-continue metoprolol succinate 50 daily


-continue spironolactone 25 qd


-continue amlodipine 2.5 qd





#Hyperlipidemia: chronic


-Lipitor 20mg Po HS





#FEN


-Not on any standing fluids


-Electrolytes wnl, routine bmp monitoring


-diabetic diet





#Prophylaxis


-SCDs





#Disposition


-transfer to med-surg


 





Visit type





- Emergency Visit


Emergency Visit: Yes


ED Registration Date: 04/10/19


Care time: The patient presented to the Emergency Department on the above date 

and was hospitalized for further evaluation of their emergent condition.





- New Patient


This patient is new to me today: Yes


Date on this admission: 04/11/19





- Critical Care


Critical Care patient: No

## 2019-04-11 NOTE — PN
Teaching Attending Note


Name of Resident: Janette Monsivais





ATTENDING PHYSICIAN STATEMENT





I saw and evaluated the patient.


I reviewed the resident's note and discussed the case with the resident.


I agree with the resident's findings and plan as documented.








SUBJECTIVE:





Patient is comfortable with no acute distress , no nausea or vomiting. 


OBJECTIVE:


 Vital Signs











Temperature  98.6 F   04/10/19 23:57


 


Pulse Rate  91 H  04/10/19 23:57


 


Respiratory Rate  18   04/10/19 23:57


 


Blood Pressure  150/62   04/10/19 23:57


 


O2 Sat by Pulse Oximetry (%)  95   04/10/19 23:57











GENERAL: The patient is awake, alert, and fully oriented, in no acute distress.


HEAD: Normal with no signs of trauma.


EYES: PERRL, extraocular movements intact, sclera anicteric, conjunctiva clear. 


ENT: Ears normal, oropharynx clear without exudates, moist mucous membranes.


NECK: Trachea midline, full range of motion, supple. 


LUNGS: Breath sounds equal, clear to auscultation bilaterally, no wheezes, no 

crackles, no accessory muscle use. 


HEART: Regular rate and rhythm, S1, S2 without murmur, rub or gallop.


ABDOMEN: Soft, nontender, nondistended, normoactive bowel sounds, no guarding, 

no rebound, no hepatosplenomegaly, no masses.


EXTREMITIES: 2+ pulses, warm, well-perfused, no edema. 


NEUROLOGICAL: Cranial nerves II through XII grossly intact. Normal speech, gait 

not observed.


PSYCH: Normal mood, normal affect.


SKIN: Warm, dry, normal turgor, no rashes or lesions noted





 CBCD











WBC  2.7 K/mm3 (4.0-10.0)  L  04/11/19  05:30    


 


RBC  3.33 M/mm3 (4.00-5.60)  L  04/11/19  05:30    


 


Hgb  10.1 GM/dL (11.7-16.9)  L  04/11/19  05:30    


 


Hct  30.1 % (35.4-49)  L  04/11/19  05:30    


 


MCV  90.5 fl (80-96)   04/11/19  05:30    


 


MCHC  33.7 g/dl (32.0-35.9)   04/11/19  05:30    


 


RDW  19.0 % (11.9-15.9)  H  04/11/19  05:30    


 


Plt Count  50 K/MM3 (134-434)  L  04/11/19  05:30    


 


MPV  8.7 fl (7.5-11.1)   04/11/19  05:30    








 CMP











Sodium  139 mmol/L (136-145)   04/11/19  05:30    


 


Potassium  4.0 mmol/L (3.5-5.1)   04/11/19  05:30    


 


Chloride  107 mmol/L ()   04/11/19  05:30    


 


Carbon Dioxide  27 mmol/L (21-32)   04/11/19  05:30    


 


Anion Gap  5 MMOL/L (8-16)  L  04/11/19  05:30    


 


BUN  17 mg/dL (7-18)   04/11/19  05:30    


 


Creatinine  2.1 mg/dL (0.55-1.3)  H  04/11/19  05:30    


 


Creat Clearance w eGFR  31.29  (>60)   04/11/19  05:30    


 


Random Glucose  85 mg/dL ()   04/11/19  05:30    


 


Calcium  8.0 mg/dL (8.5-10.1)  L  04/11/19  05:30    


 


Total Bilirubin  0.9 mg/dL (0.2-1)   04/11/19  05:30    


 


AST  86 U/L (15-37)  H  04/11/19  05:30    


 


ALT  31 U/L (13-61)   04/11/19  05:30    


 


Alkaline Phosphatase  169 U/L ()  H  04/11/19  05:30    


 


Total Protein  6.7 g/dl (6.4-8.2)   04/11/19  05:30    


 


Albumin  3.1 g/dl (3.4-5.0)  L  04/11/19  05:30    








 Current Medications











Generic Name Dose Route Start Last Admin





  Trade Name Freq  PRN Reason Stop Dose Admin


 


Amlodipine Besylate  2.5 mg  04/11/19 10:00  





  Norvasc -  PO   





  DAILY [facility]   





     





     





     





     


 


Atorvastatin Calcium  20 mg  04/10/19 22:00  04/10/19 22:23





  Lipitor -  PO   20 mg





  [facility]   Administration





     





     





     





     


 


Folic Acid  1 mg  04/11/19 10:00  





  Folic Acid -  PO   





  DAILY [facility]   





     





     





     





     


 


Gabapentin  600 mg  04/11/19 10:00  





  Neurontin -  PO   





  DAILY [facility]   





     





     





     





     


 


Heparin Sodium (Porcine)  5,000 unit  04/10/19 22:00  04/11/19 06:25





  Heparin -  SQ   5,000 unit





  TID [facility]   Administration





     





     





     





     


 


Insulin Aspart  1 vial  04/10/19 22:00  04/11/19 06:30





  Novolog Vial Sliding Scale -  SQ   Not Given





  [facility]   





     





     





  Protocol   





     


 


Insulin Detemir  40 units  04/11/19 07:00  04/11/19 06:30





  Levemir Vial  SQ   40 units





  BID@0700,2200 [facility]   Administration





     





     





     





     


 


Lorazepam  0.5 mg  04/12/19 05:00  





  Ativan -  PO  04/13/19 05:01  





  Q6H [facility]   





     





     





     





     


 


Lorazepam  0.5 mg  04/12/19 05:00  





  Ativan -  PO  04/13/19 05:00  





  Q4H PRN   





  Symptoms of Withdrawal   





     





     





     


 


Lorazepam  1 mg  04/11/19 05:00  04/11/19 05:06





  Ativan -  PO  04/11/19 23:01  1 mg





  0500,1100,1700,2300 [facility]   Administration





     





     





     





     


 


Lorazepam  1 mg  04/10/19 19:24  





  Ativan -  PO  04/12/19 05:00  





  Q4H PRN   





  Symptoms of Withdrawal   





     





     





     


 


Metoprolol Succinate  50 mg  04/11/19 10:00  





  Toprol Xl -  PO   





  DAILY [facility]   





     





     





     





     


 


Non-Formulary Medication  1 each  04/11/19 10:00  





  Lipase/Protease/Amylase [Creon Dr 24,000 Units Capsule]  PO   





  DAILY [facility]   





     





     





     





     


 


Non-Formulary Medication  20 mg  04/11/19 10:00  





  Tadalafil [Cialis]  PO   





  DAILY [facility]   





     





     





     





     


 


Pantoprazole Sodium  40 mg  04/11/19 10:00  





  Protonix -  PO   





  DAILY [facility]   





     





     





     





     


 


Spironolactone  25 mg  04/11/19 10:00  





  Aldactone -  PO   





  DAILY [facility]   





     





     





     





     


 


Thiamine HCl  100 mg  04/11/19 10:00  





  Vitamin B1 -  PO   





  DAILY [facility]   





     





     





     





     


 


Zolpidem Tartrate  5 mg  04/10/19 22:00  





  Ambien -  PO   





  [facility] PRN   





  [facility]   





     





     





     








 Home Medications











 Medication  Instructions  Recorded


 


Amlodipine Besylate 2.5 mg PO DAILY 04/10/19


 


Atorvastatin Ca [Lipitor] 20 mg PO HS 04/10/19


 


Esomeprazole Magnesium 40 mg PO DAILY 04/10/19


 


Gabapentin 600 mg PO DAILY 04/10/19


 


Insulin Degludec [Tresiba] 40 unit SQ BID 04/10/19


 


Lipase/Protease/Amylase [Remigio Rome 1 each PO DAILY 04/10/19





24,000 Units Capsule]  


 


Liraglutide [Victoza -] 1.8 mg SQ DAILY@0700 04/10/19


 


Metformin HCl [Metformin HCl ER] 500 mg PO QID 04/10/19


 


Metoprolol Succinate [Toprol Xl] 50 mg PO DAILY 04/10/19


 


Spironolactone 25 mg PO DAILY 04/10/19


 


Tadalafil [Cialis] 20 mg PO DAILY 04/10/19


 


Zolpidem Tartrate [Ambien] 10 mg PO HS 04/10/19














US of abdomen: Fatty Liver vs hepatocellular disease. mild splenomegaly, 

thickening of gallbladder wall mainly at the fundus with suggestion os small 

sludge.





CT of the chest : Oval shaped pleural based masslike density in the right 

midlung, laterally measuring 4x1.4cm in AP and transverse dimension compatible 

with Lipoma. Slightly displaced acute fracture in the posterior arch of the 

right 10th through 12th rib.





ASSESSMENT AND PLAN:


Patient is a 73 year old male with hx of alcohol abuse, sleep apnea, non-

insulin dependent diabetes, HTN, alcohol withdrawal seizure (only one in the 

past, last seizure 3 years ago), and multiple detox admissions (2018). 





#Alcohol Withdrawal: on detox protocol continue





#Acute over chronic Kidney injury : outpatient nephrologist name is Dr. Haile Del Cid at [phone], follow abdominal US





#Pancytopenia: due to liver disease due to etoh dependency, will monitor 





#T2DM: BGM ACHS, levemir





#Hypertension: Uncontrolled





#Hyperlipidemia: chronic, on  statin





diabetic diet





DVT Px: NO AC since platelets are in 50s

## 2019-04-12 LAB
ALBUMIN SERPL-MCNC: 3 G/DL (ref 3.4–5)
ALP SERPL-CCNC: 159 U/L (ref 45–117)
ALT SERPL-CCNC: 29 U/L (ref 13–61)
ANION GAP SERPL CALC-SCNC: 5 MMOL/L (ref 8–16)
AST SERPL-CCNC: 83 U/L (ref 15–37)
BILIRUB SERPL-MCNC: 0.6 MG/DL (ref 0.2–1)
BUN SERPL-MCNC: 18 MG/DL (ref 7–18)
CALCIUM SERPL-MCNC: 7.9 MG/DL (ref 8.5–10.1)
CHLORIDE SERPL-SCNC: 107 MMOL/L (ref 98–107)
CO2 SERPL-SCNC: 25 MMOL/L (ref 21–32)
CREAT SERPL-MCNC: 2 MG/DL (ref 0.55–1.3)
DEPRECATED RDW RBC AUTO: 18.3 % (ref 11.9–15.9)
GLUCOSE SERPL-MCNC: 124 MG/DL (ref 74–106)
HCT VFR BLD CALC: 29.2 % (ref 35.4–49)
HGB BLD-MCNC: 9.7 GM/DL (ref 11.7–16.9)
MAGNESIUM SERPL-MCNC: 1.8 MG/DL (ref 1.8–2.4)
MCH RBC QN AUTO: 30 PG (ref 25.7–33.7)
MCHC RBC AUTO-ENTMCNC: 33.3 G/DL (ref 32–35.9)
MCV RBC: 90.3 FL (ref 80–96)
PHOSPHATE SERPL-MCNC: 3.1 MG/DL (ref 2.5–4.9)
PLATELET # BLD AUTO: 52 K/MM3 (ref 134–434)
PMV BLD: 8 FL (ref 7.5–11.1)
POTASSIUM SERPLBLD-SCNC: 3.5 MMOL/L (ref 3.5–5.1)
PROT SERPL-MCNC: 6.5 G/DL (ref 6.4–8.2)
RBC # BLD AUTO: 3.23 M/MM3 (ref 4–5.6)
SODIUM SERPL-SCNC: 137 MMOL/L (ref 136–145)
WBC # BLD AUTO: 2.6 K/MM3 (ref 4–10)

## 2019-04-12 RX ADMIN — INSULIN DETEMIR SCH: 100 INJECTION, SOLUTION SUBCUTANEOUS at 06:05

## 2019-04-12 RX ADMIN — Medication SCH: at 12:24

## 2019-04-12 RX ADMIN — ATORVASTATIN CALCIUM SCH MG: 20 TABLET, FILM COATED ORAL at 21:43

## 2019-04-12 RX ADMIN — PANCRELIPASE SCH CAP: 30000; 6000; 19000 CAPSULE, DELAYED RELEASE PELLETS ORAL at 12:19

## 2019-04-12 RX ADMIN — ZOLPIDEM TARTRATE PRN MG: 5 TABLET, COATED ORAL at 21:43

## 2019-04-12 RX ADMIN — INSULIN ASPART SCH: 100 INJECTION, SOLUTION INTRAVENOUS; SUBCUTANEOUS at 06:05

## 2019-04-12 RX ADMIN — INSULIN ASPART SCH UNITS: 100 INJECTION, SOLUTION INTRAVENOUS; SUBCUTANEOUS at 17:49

## 2019-04-12 RX ADMIN — AMLODIPINE BESYLATE SCH MG: 2.5 TABLET ORAL at 10:22

## 2019-04-12 RX ADMIN — ERYTHROMYCIN SCH APPLIC: 5 OINTMENT OPHTHALMIC at 10:23

## 2019-04-12 RX ADMIN — INSULIN ASPART SCH: 100 INJECTION, SOLUTION INTRAVENOUS; SUBCUTANEOUS at 21:48

## 2019-04-12 RX ADMIN — SPIRONOLACTONE SCH MG: 25 TABLET, FILM COATED ORAL at 10:22

## 2019-04-12 RX ADMIN — FOLIC ACID SCH MG: 1 TABLET ORAL at 10:22

## 2019-04-12 RX ADMIN — INSULIN DETEMIR SCH: 100 INJECTION, SOLUTION SUBCUTANEOUS at 21:43

## 2019-04-12 RX ADMIN — PANCRELIPASE SCH CAP: 30000; 6000; 19000 CAPSULE, DELAYED RELEASE PELLETS ORAL at 10:24

## 2019-04-12 RX ADMIN — PANTOPRAZOLE SODIUM SCH MG: 40 TABLET, DELAYED RELEASE ORAL at 10:21

## 2019-04-12 RX ADMIN — INSULIN ASPART SCH: 100 INJECTION, SOLUTION INTRAVENOUS; SUBCUTANEOUS at 12:23

## 2019-04-12 RX ADMIN — PANCRELIPASE SCH CAP: 30000; 6000; 19000 CAPSULE, DELAYED RELEASE PELLETS ORAL at 18:30

## 2019-04-12 RX ADMIN — GABAPENTIN SCH MG: 300 CAPSULE ORAL at 10:22

## 2019-04-12 NOTE — PN
Physical Exam: 


SUBJECTIVE: Patient seen and examined at bedside this morning. No acute events 

overnight. Patient feeling well, and has no complaints. 








OBJECTIVE:





 Vital Signs











Temperature  98.0 F   04/12/19 17:43


 


Pulse Rate  60   04/12/19 17:43


 


Respiratory Rate  18   04/12/19 17:43


 


Blood Pressure  128/70   04/12/19 17:43


 


O2 Sat by Pulse Oximetry (%)  99   04/11/19 21:00














GENERAL: The patient is awake, alert, and fully oriented, in no acute distress.


HEAD: Normal with no signs of trauma.


EYES: PERRLA, EOMI, sclera anicteric, conjunctiva clear. 


ENT: oropharynx clear without exudates, moist mucous membranes.


NECK: Trachea midline, full range of motion, supple. 


LUNGS: Breath sounds equal, clear to auscultation bilaterally.


HEART: Regular rate and rhythm, S1, S2 without murmur, rub or gallop.


ABDOMEN: Soft, nontender, nondistended, normoactive bowel sounds.


EXTREMITIES: 2+ pulses, warm, well-perfused, no edema. 


NEUROLOGICAL: Cranial nerves II through XII grossly intact. Normal speech, 

normal gait. Motor strength 5/5, sensation intact. 


PSYCH: Normal mood, normal affect.


SKIN: Warm, dry, normal turgor, no rashes or lesions noted








 Laboratory Results - last 24 hr











  04/11/19 04/12/19 04/12/19





  21:47 05:46 06:30


 


WBC    2.6 L


 


RBC    3.23 L


 


Hgb    9.7 L


 


Hct    29.2 L


 


MCV    90.3


 


MCH    30.0


 


MCHC    33.3


 


RDW    18.3 H


 


Plt Count    52 L


 


MPV    8.0


 


Sodium   


 


Potassium   


 


Chloride   


 


Carbon Dioxide   


 


Anion Gap   


 


BUN   


 


Creatinine   


 


Creat Clearance w eGFR   


 


POC Glucometer  211  41 


 


Random Glucose   


 


Calcium   


 


Phosphorus   


 


Magnesium   


 


Total Bilirubin   


 


AST   


 


ALT   


 


Alkaline Phosphatase   


 


Total Protein   


 


Albumin   














  04/12/19 04/12/19 04/12/19





  06:30 06:48 12:21


 


WBC   


 


RBC   


 


Hgb   


 


Hct   


 


MCV   


 


MCH   


 


MCHC   


 


RDW   


 


Plt Count   


 


MPV   


 


Sodium  137  


 


Potassium  3.5  


 


Chloride  107  


 


Carbon Dioxide  25  


 


Anion Gap  5 L  


 


BUN  18  


 


Creatinine  2.0 H  


 


Creat Clearance w eGFR  33.10  


 


POC Glucometer   121  108


 


Random Glucose  124 H  


 


Calcium  7.9 L  


 


Phosphorus  3.1  


 


Magnesium  1.8  


 


Total Bilirubin  0.6  


 


AST  83 H  


 


ALT  29  


 


Alkaline Phosphatase  159 H  


 


Total Protein  6.5  


 


Albumin  3.0 L  














  04/12/19





  17:47


 


WBC 


 


RBC 


 


Hgb 


 


Hct 


 


MCV 


 


MCH 


 


MCHC 


 


RDW 


 


Plt Count 


 


MPV 


 


Sodium 


 


Potassium 


 


Chloride 


 


Carbon Dioxide 


 


Anion Gap 


 


BUN 


 


Creatinine 


 


Creat Clearance w eGFR 


 


POC Glucometer  404


 


Random Glucose 


 


Calcium 


 


Phosphorus 


 


Magnesium 


 


Total Bilirubin 


 


AST 


 


ALT 


 


Alkaline Phosphatase 


 


Total Protein 


 


Albumin 








Active Medications











Generic Name Dose Route Start Last Admin





  Trade Name Freq  PRN Reason Stop Dose Admin


 


Amlodipine Besylate  2.5 mg  04/12/19 10:00  04/12/19 10:22





  Norvasc -  PO   2.5 mg





  [facility]   Administration





     





     





     





     


 


Atorvastatin Calcium  20 mg  04/11/19 22:00  04/11/19 22:34





  Lipitor -  PO   20 mg





  [facility]   Administration





     





     





     





     


 


Erythromycin  1 applic  04/11/19 19:00  04/12/19 10:23





  Erythromycin 0.5% Eye Ointment  OD   1 applic





  [facility]   Administration





     





     





     





     


 


Folic Acid  1 mg  04/12/19 10:00  04/12/19 10:22





  Folic Acid -  PO   1 mg





  [facility]   Administration





     





     





     





     


 


Gabapentin  600 mg  04/12/19 10:00  04/12/19 10:22





  Neurontin -  PO   600 mg





  [facility]   Administration





     





     





     





     


 


Insulin Aspart  1 vial  04/11/19 16:30  04/12/19 17:49





  Novolog Vial Sliding Scale -  SQ   12 units





  [facility]   Administration





     





     





  Protocol   





     


 


Insulin Detemir  40 units  04/11/19 22:00  04/12/19 06:05





  Levemir Vial  SQ   Not Given





  BID@0700,2200 [facility]   





     





     





     





     


 


Lorazepam  0.5 mg  04/12/19 05:00  





  Ativan -  PO  04/13/19 05:00  





  Q4H PRN   





  Symptoms of Withdrawal   





     





     





     


 


Lorazepam  0.5 mg  04/12/19 05:00  04/12/19 17:50





  Ativan -  PO  04/13/19 05:01  0.5 mg





  Q6H [facility]   Administration





     





     





     





     


 


Metoprolol Succinate  50 mg  04/12/19 10:00  04/12/19 10:21





  Toprol Xl -  PO   50 mg





  [facility]ILY [facility]   Administration





     





     





     





     


 


Non-Formulary Medication  20 mg  04/12/19 10:00  





  Tadalafil [Cialis]  PO   





  DAILY [facility]   





     





     





     





     


 


Pancrelipase  4 cap  04/12/19 08:00  04/12/19 12:19





  Creon Dr 6,000 Units Capsule  PO   4 cap





  [facility]   Administration





     





     





     





     


 


Pantoprazole Sodium  40 mg  04/12/19 10:00  04/12/19 10:21





  Protonix -  PO   40 mg





  [facility]ILY [facility]   Administration





     





     





     





     


 


Spironolactone  25 mg  04/12/19 10:00  04/12/19 10:22





  Aldactone -  PO   25 mg





  [facility]ILY [facility]   Administration





     





     





     





     


 


Thiamine HCl  100 mg  04/12/19 10:00  04/12/19 12:24





  Vitamin B1 -  PO   Not Given





  [facility]   





     





     





     





     


 


Zolpidem Tartrate  5 mg  04/12/19 09:21  





  Ambien -  PO   





  HS PRN   





  [facility]   





     





     





     











ASSESSMENT/PLAN:


Patient is a 73 year old male with past medical history of alcohol abuse, sleep 

apnea, DM, HTN, alcohol withdrawal seizure (last episode 3 years ago), and 

multiple detox admissions, brought in for detox by one of his outpatient 

doctors.





#Alcohol Withdrawal


-CIWA 0


-Ativan protocol


-Dr. Chi consulted. Recommendations appreciated. 


-Thiamine 100mg and Folic acid 1 mg daily


-fall precautions


-seizure precaution





#CKD


-BUN/Cr 18/2, at baseline as per nephrologist (Dr. Haile Del Cid)





#Pancytopenia: likely 2/2 alcohol use disorder and hepatic congestion/liver 

cirrhosis


-Abdominal US: Fatty liver vs HCD. Thickening of the gallbladder wall mainly at 

the fundus with suggestion of a small sludge layering posteriorly and without 

evidence of gallstones or pericholecystic free fluid. Mild splenomegaly. 


-will continue to monitor CBC


-Heparin ppx discontinued.


-may need further workup of pancytopenia as outpatient





#Pleural Lesion on CXR


-chest CT: oval shaped pleural based mass like density in right midlung 4x14cm, 

suggestive of lipoma. otherwise, no acute lung disease or nodules. Slightly 

displaced acute fracture of right 10th-12th rib. 





#DM: insulin dependent


-HbA1c 7.4


-Levemir 40unit sq BID


-Insulin sliding scale 


-BGM ACHS


-will hold home meds





#Hypertension: chronic


-continue metoprolol succinate 50 daily


-continue spironolactone 25 qd


-continue amlodipine 2.5 qd





#Hyperlipidemia: chronic


-Lipitor 20mg Po HS





#FEN


-Not on any standing fluids


-Electrolytes wnl, routine bmp monitoring


-diabetic diet





#Prophylaxis


-SCDs





#Disposition


-transfer to med-surg





Visit type





- Emergency Visit


Emergency Visit: Yes


ED Registration Date: 04/10/19


Care time: The patient presented to the Emergency Department on the above date 

and was hospitalized for further evaluation of their emergent condition.





- New Patient


This patient is new to me today: No





- Critical Care


Critical Care patient: No

## 2019-04-12 NOTE — PN
Teaching Attending Note


Name of Resident: Janette Monsivais





ATTENDING PHYSICIAN STATEMENT





I saw and evaluated the patient.


I reviewed the resident's note and discussed the case with the resident.


I agree with the resident's findings and plan as documented.








SUBJECTIVE:


Patient is feeling better with no acute distress.





OBJECTIVE:


 Vital Signs











Temperature  98.0 F   04/12/19 17:43


 


Pulse Rate  60   04/12/19 17:43


 


Respiratory Rate  18   04/12/19 17:43


 


Blood Pressure  128/70   04/12/19 17:43


 


O2 Sat by Pulse Oximetry (%)  99   04/12/19 09:00








GENERAL: The patient is awake, alert, and fully oriented, in no acute distress.


HEAD: Normal with no signs of trauma.


EYES: PERRL, extraocular movements intact, sclera anicteric, conjunctiva clear. 


ENT: Ears normal, oropharynx clear without exudates, moist mucous membranes.


NECK: Trachea midline, full range of motion, supple. 


LUNGS: Breath sounds equal, clear to auscultation bilaterally, no wheezes, no 

crackles, no accessory muscle use. 


HEART: Regular rate and rhythm, S1, S2 without murmur, rub or gallop.


ABDOMEN: Soft, nontender, nondistended, normoactive bowel sounds, no guarding, 

no rebound, no hepatosplenomegaly, no masses.


EXTREMITIES: 2+ pulses, warm, well-perfused, no edema. 


NEUROLOGICAL: Cranial nerves II through XII grossly intact. Normal speech, gait 

not observed.


PSYCH: Normal mood, normal affect.


SKIN: Warm, dry, normal turgor, no rashes or lesions noted


 CBCD











WBC  2.6 K/mm3 (4.0-10.0)  L  04/12/19  06:30    


 


RBC  3.23 M/mm3 (4.00-5.60)  L  04/12/19  06:30    


 


Hgb  9.7 GM/dL (11.7-16.9)  L  04/12/19  06:30    


 


Hct  29.2 % (35.4-49)  L  04/12/19  06:30    


 


MCV  90.3 fl (80-96)   04/12/19  06:30    


 


MCHC  33.3 g/dl (32.0-35.9)   04/12/19  06:30    


 


RDW  18.3 % (11.9-15.9)  H  04/12/19  06:30    


 


Plt Count  52 K/MM3 (134-434)  L  04/12/19  06:30    


 


MPV  8.0 fl (7.5-11.1)   04/12/19  06:30    








 CMP











Sodium  137 mmol/L (136-145)   04/12/19  06:30    


 


Potassium  3.5 mmol/L (3.5-5.1)   04/12/19  06:30    


 


Chloride  107 mmol/L ()   04/12/19  06:30    


 


Carbon Dioxide  25 mmol/L (21-32)   04/12/19  06:30    


 


Anion Gap  5 MMOL/L (8-16)  L  04/12/19  06:30    


 


BUN  18 mg/dL (7-18)   04/12/19  06:30    


 


Creatinine  2.0 mg/dL (0.55-1.3)  H  04/12/19  06:30    


 


Creat Clearance w eGFR  33.10  (>60)   04/12/19  06:30    


 


Random Glucose  124 mg/dL ()  H  04/12/19  06:30    


 


Calcium  7.9 mg/dL (8.5-10.1)  L  04/12/19  06:30    


 


Total Bilirubin  0.6 mg/dL (0.2-1)   04/12/19  06:30    


 


AST  83 U/L (15-37)  H  04/12/19  06:30    


 


ALT  29 U/L (13-61)   04/12/19  06:30    


 


Alkaline Phosphatase  159 U/L ()  H  04/12/19  06:30    


 


Total Protein  6.5 g/dl (6.4-8.2)   04/12/19  06:30    


 


Albumin  3.0 g/dl (3.4-5.0)  L  04/12/19  06:30    








 Current Medications











Generic Name Dose Route Start Last Admin





  Trade Name Freq  PRN Reason Stop Dose Admin


 


Amlodipine Besylate  2.5 mg  04/12/19 10:00  04/12/19 10:22





  Norvasc -  PO   2.5 mg





  [facility]ILY [facility]   Administration





     





     





     





     


 


Atorvastatin Calcium  20 mg  04/11/19 22:00  04/12/19 21:43





  Lipitor -  PO   20 mg





  [facility]   Administration





     





     





     





     


 


Erythromycin  1 applic  04/11/19 19:00  04/12/19 10:23





  Erythromycin 0.5% Eye Ointment  OD   1 applic





  [facility]ILY [facility]   Administration





     





     





     





     


 


Folic Acid  1 mg  04/12/19 10:00  04/12/19 10:22





  Folic Acid -  PO   1 mg





  [facility]ILY [facility]   Administration





     





     





     





     


 


Gabapentin  600 mg  04/12/19 10:00  04/12/19 10:22





  Neurontin -  PO   600 mg





  [facility]ILY [facility]   Administration





     





     





     





     


 


Insulin Aspart  1 vial  04/11/19 16:30  04/12/19 21:48





  Novolog Vial Sliding Scale -  SQ   Not Given





  [facility]   





     





     





  Protocol   





     


 


Insulin Detemir  40 units  04/11/19 22:00  04/12/19 21:43





  Levemir Vial  SQ   Not Given





  BID@0700,2200 [facility]   





     





     





     





     


 


Lorazepam  0.5 mg  04/12/19 05:00  04/12/19 21:44





  Ativan -  PO  04/13/19 05:00  0.5 mg





  Q4H PRN   Administration





  Symptoms of Withdrawal   





     





     





     


 


Lorazepam  0.5 mg  04/12/19 05:00  04/12/19 17:50





  Ativan -  PO  04/13/19 05:01  0.5 mg





  Q6H [facility]   Administration





     





     





     





     


 


Metoprolol Succinate  50 mg  04/12/19 10:00  04/12/19 10:21





  Toprol Xl -  PO   50 mg





  DAILY [facility]   Administration





     





     





     





     


 


Non-Formulary Medication  20 mg  04/12/19 10:00  





  Tadalafil [Cialis]  PO   





  DAILY [facility]   





     





     





     





     


 


Pancrelipase  4 cap  04/12/19 08:00  04/12/19 18:30





  Creon Dr 6,000 Units Capsule  PO   4 cap





  [facility]   Administration





     





     





     





     


 


Pantoprazole Sodium  40 mg  04/12/19 10:00  04/12/19 10:21





  Protonix -  PO   40 mg





  DAILY [facility]   Administration





     





     





     





     


 


Spironolactone  25 mg  04/12/19 10:00  04/12/19 10:22





  Aldactone -  PO   25 mg





  DAILY [facility]   Administration





     





     





     





     


 


Thiamine HCl  100 mg  04/12/19 10:00  04/12/19 12:24





  Vitamin B1 -  PO   Not Given





  DAILY [facility]   





     





     





     





     


 


Zolpidem Tartrate  5 mg  04/12/19 20:58  04/12/19 21:43





  Ambien -  PO   5 mg





  HS PRN   Administration





  [facility]   





     





     





     








 Home Medications











 Medication  Instructions  Recorded


 


Amlodipine Besylate 2.5 mg PO DAILY 04/10/19


 


Atorvastatin Ca [Lipitor] 20 mg PO HS 04/10/19


 


Esomeprazole Magnesium 40 mg PO DAILY 04/10/19


 


Gabapentin 600 mg PO DAILY 04/10/19


 


Insulin Degludec [Tresiba] 40 unit SQ BID 04/10/19


 


Lipase/Protease/Amylase [Creon Dr 5 each PO TID 04/10/19





24,000 Units Capsule]  


 


Liraglutide [Victoza -] 1.8 mg SQ DAILY@0700 04/10/19


 


Metformin HCl [Metformin HCl ER] 500 mg PO QID 04/10/19


 


Metoprolol Succinate [Toprol Xl] 50 mg PO DAILY 04/10/19


 


Spironolactone 25 mg PO DAILY 04/10/19


 


Zolpidem Tartrate [Ambien] 10 mg PO HS 04/10/19

















US of abdomen: Fatty Liver vs hepatocellular disease. mild splenomegaly, 

thickening of gallbladder wall mainly at the fundus with suggestion os small 

sludge.





CT of the chest : Oval shaped pleural based masslike density in the right 

midlung, laterally measuring 4x1.4cm in AP and transverse dimension compatible 

with Lipoma. Slightly displaced acute fracture in the posterior arch of the 

right 10th through 12th rib.





ASSESSMENT AND PLAN:


Patient is a 73 year old male with hx of alcohol abuse, sleep apnea, non-

insulin dependent diabetes, HTN, alcohol withdrawal seizure (only one in the 

past, last seizure 3 years ago), and multiple detox admissions (2018). 





#Alcohol Withdrawal: on detox protocol , to continue for now. 





#Acute over chronic Kidney injury :Creatine 2. 5--> 2.0 today ;outpatient 

nephrologist name is Dr. Haile Del Cid at [phone]. patient willfollow up 

with him. 





#Pancytopenia: due to liver disease due to etoh dependency, will repeat the lab 

continue to hold AC





#T2DM: BGM ACHS, levemir





#Hypertension: controlled now, continue current regimen. 





#Hyperlipidemia:on statin continue 





diabetic diet





DVT Px: NO AC since platelets are in 50s

## 2019-04-13 LAB
ANION GAP SERPL CALC-SCNC: 7 MMOL/L (ref 8–16)
BASOPHILS # BLD: 0.1 % (ref 0–2)
BUN SERPL-MCNC: 18 MG/DL (ref 7–18)
CALCIUM SERPL-MCNC: 8.3 MG/DL (ref 8.5–10.1)
CHLORIDE SERPL-SCNC: 106 MMOL/L (ref 98–107)
CO2 SERPL-SCNC: 25 MMOL/L (ref 21–32)
CREAT SERPL-MCNC: 2 MG/DL (ref 0.55–1.3)
DEPRECATED RDW RBC AUTO: 18.4 % (ref 11.9–15.9)
EOSINOPHIL # BLD: 0.7 % (ref 0–4.5)
GLUCOSE SERPL-MCNC: 190 MG/DL (ref 74–106)
HCT VFR BLD CALC: 33.1 % (ref 35.4–49)
HGB BLD-MCNC: 10.9 GM/DL (ref 11.7–16.9)
LYMPHOCYTES # BLD: 17.9 % (ref 8–40)
MCH RBC QN AUTO: 30 PG (ref 25.7–33.7)
MCHC RBC AUTO-ENTMCNC: 32.9 G/DL (ref 32–35.9)
MCV RBC: 91.4 FL (ref 80–96)
MONOCYTES # BLD AUTO: 8.5 % (ref 3.8–10.2)
NEUTROPHILS # BLD: 72.8 % (ref 42.8–82.8)
PLATELET # BLD AUTO: 71 K/MM3 (ref 134–434)
PMV BLD: 8.9 FL (ref 7.5–11.1)
POTASSIUM SERPLBLD-SCNC: 4.1 MMOL/L (ref 3.5–5.1)
RBC # BLD AUTO: 3.62 M/MM3 (ref 4–5.6)
SODIUM SERPL-SCNC: 137 MMOL/L (ref 136–145)
WBC # BLD AUTO: 5 K/MM3 (ref 4–10)

## 2019-04-13 RX ADMIN — PANTOPRAZOLE SODIUM SCH MG: 40 TABLET, DELAYED RELEASE ORAL at 11:03

## 2019-04-13 RX ADMIN — PANCRELIPASE SCH CAP: 30000; 6000; 19000 CAPSULE, DELAYED RELEASE PELLETS ORAL at 18:50

## 2019-04-13 RX ADMIN — INSULIN ASPART SCH UNITS: 100 INJECTION, SOLUTION INTRAVENOUS; SUBCUTANEOUS at 21:26

## 2019-04-13 RX ADMIN — Medication SCH MG: at 13:48

## 2019-04-13 RX ADMIN — PANCRELIPASE SCH CAP: 30000; 6000; 19000 CAPSULE, DELAYED RELEASE PELLETS ORAL at 13:48

## 2019-04-13 RX ADMIN — PANCRELIPASE SCH CAP: 30000; 6000; 19000 CAPSULE, DELAYED RELEASE PELLETS ORAL at 11:04

## 2019-04-13 RX ADMIN — INSULIN ASPART SCH UNITS: 100 INJECTION, SOLUTION INTRAVENOUS; SUBCUTANEOUS at 17:28

## 2019-04-13 RX ADMIN — INSULIN DETEMIR SCH: 100 INJECTION, SOLUTION SUBCUTANEOUS at 06:26

## 2019-04-13 RX ADMIN — ERYTHROMYCIN SCH APPLIC: 5 OINTMENT OPHTHALMIC at 11:42

## 2019-04-13 RX ADMIN — GABAPENTIN SCH MG: 300 CAPSULE ORAL at 11:03

## 2019-04-13 RX ADMIN — FOLIC ACID SCH MG: 1 TABLET ORAL at 11:02

## 2019-04-13 RX ADMIN — INSULIN DETEMIR SCH UNITS: 100 INJECTION, SOLUTION SUBCUTANEOUS at 21:19

## 2019-04-13 RX ADMIN — INSULIN ASPART SCH: 100 INJECTION, SOLUTION INTRAVENOUS; SUBCUTANEOUS at 06:27

## 2019-04-13 RX ADMIN — SPIRONOLACTONE SCH MG: 25 TABLET, FILM COATED ORAL at 11:02

## 2019-04-13 RX ADMIN — ATORVASTATIN CALCIUM SCH MG: 20 TABLET, FILM COATED ORAL at 21:19

## 2019-04-13 RX ADMIN — INSULIN ASPART SCH UNITS: 100 INJECTION, SOLUTION INTRAVENOUS; SUBCUTANEOUS at 11:01

## 2019-04-13 RX ADMIN — ZOLPIDEM TARTRATE PRN MG: 5 TABLET, COATED ORAL at 21:33

## 2019-04-13 RX ADMIN — AMLODIPINE BESYLATE SCH MG: 2.5 TABLET ORAL at 11:03

## 2019-04-13 NOTE — DS
Physical Exam: 


SUBJECTIVE: Patient seen and examined





Patient is feeling better with no acute distress, no fever or chills, wife at 

bedside.


OBJECTIVE:


 Vital Signs











Temperature  97.7 F   04/13/19 06:00


 


Pulse Rate  84   04/13/19 10:00


 


Respiratory Rate  18   04/13/19 10:00


 


Blood Pressure  150/76   04/13/19 10:00


 


O2 Sat by Pulse Oximetry (%)  100   04/13/19 09:00











 


PHYSICAL EXAM





GENERAL: The patient is awake, alert, and fully oriented, in no acute distress.


HEAD: Normal with no signs of trauma.


EYES: PERRL, extraocular movements intact, sclera anicteric, conjunctiva clear. 


ENT: Ears normal, oropharynx clear without exudates, moist mucous membranes.


NECK: Trachea midline, full range of motion, supple. 


LUNGS: Breath sounds equal, clear to auscultation bilaterally, no wheezes, no 

crackles, no accessory muscle use. 


HEART: Regular rate and rhythm, S1, S2 without murmur, rub or gallop.


ABDOMEN: Soft, nontender, nondistended, normoactive bowel sounds, no guarding, 

no rebound, no hepatosplenomegaly, no masses.


EXTREMITIES: 2+ pulses, warm, well-perfused, no edema. 


NEUROLOGICAL: Cranial nerves II through XII grossly intact. Normal speech, gait 

not observed.


PSYCH: Normal mood, normal affect.


SKIN: Warm, dry, normal turgor, no rashes or lesions noted.





LABS


 











WBC  5.0 K/mm3 (4.0-10.0)   04/13/19  06:45    


 


RBC  3.62 M/mm3 (4.00-5.60)  L  04/13/19  06:45    


 


Hgb  10.9 GM/dL (11.7-16.9)  L  04/13/19  06:45    


 


Hct  33.1 % (35.4-49)  L  04/13/19  06:45    


 


MCV  91.4 fl (80-96)   04/13/19  06:45    


 


MCHC  32.9 g/dl (32.0-35.9)   04/13/19  06:45    


 


RDW  18.4 % (11.9-15.9)  H  04/13/19  06:45    


 


Plt Count  71 K/MM3 (134-434)  L D 04/13/19  06:45    


 


MPV  8.9 fl (7.5-11.1)  D 04/13/19  06:45    








 CMP











Sodium  137 mmol/L (136-145)   04/13/19  06:45    


 


Potassium  4.1 mmol/L (3.5-5.1)   04/13/19  06:45    


 


Chloride  106 mmol/L ()   04/13/19  06:45    


 


Carbon Dioxide  25 mmol/L (21-32)   04/13/19  06:45    


 


Anion Gap  7 MMOL/L (8-16)  L  04/13/19  06:45    


 


BUN  18 mg/dL (7-18)   04/13/19  06:45    


 


Creatinine  2.0 mg/dL (0.55-1.3)  H  04/13/19  06:45    


 


Creat Clearance w eGFR  33.10  (>60)   04/13/19  06:45    


 


Random Glucose  190 mg/dL ()  H  04/13/19  06:45    


 


Calcium  8.3 mg/dL (8.5-10.1)  L  04/13/19  06:45    


 


Total Bilirubin  0.6 mg/dL (0.2-1)   04/12/19  06:30    


 


AST  83 U/L (15-37)  H  04/12/19  06:30    


 


ALT  29 U/L (13-61)   04/12/19  06:30    


 


Alkaline Phosphatase  159 U/L ()  H  04/12/19  06:30    


 


Total Protein  6.5 g/dl (6.4-8.2)   04/12/19  06:30    


 


Albumin  3.0 g/dl (3.4-5.0)  L  04/12/19  06:30    








 Current Medications











Generic Name Dose Route Start Last Admin





  Trade Name Freq  PRN Reason Stop Dose Admin


 


Amlodipine Besylate  2.5 mg  04/12/19 10:00  04/13/19 11:03





  Norvasc -  PO   2.5 mg





  DAILY SAMUEL   Administration





     





     





     





     


 


Atorvastatin Calcium  20 mg  04/11/19 22:00  04/12/19 21:43





  Lipitor -  PO   20 mg





  HS SAMUEL   Administration





     





     





     





     


 


Erythromycin  1 applic  04/11/19 19:00  04/13/19 11:42





  Erythromycin 0.5% Eye Ointment  OD   1 applic





  DAILY SAMUEL   Administration





     





     





     





     


 


Folic Acid  1 mg  04/12/19 10:00  04/13/19 11:02





  Folic Acid -  PO   1 mg





  DAILY SAMUEL   Administration





     





     





     





     


 


Gabapentin  600 mg  04/12/19 10:00  04/13/19 11:03





  Neurontin -  PO   600 mg





  DAILY SAMUEL   Administration





     





     





     





     


 


Insulin Aspart  1 vial  04/11/19 16:30  04/13/19 11:01





  Novolog Vial Sliding Scale -  SQ   6 units





  [facility]   Administration





     





     





  Protocol   





     


 


Insulin Detemir  40 units  04/11/19 22:00  04/13/19 06:26





  Levemir Vial  SQ   Not Given





  BID@0700,2200 Count includes the [facility]   





     





     





     





     


 


Metoprolol Succinate  50 mg  04/12/19 10:00  04/13/19 11:02





  Toprol Xl -  PO   50 mg





  DAILY SAMUEL   Administration





     





     





     





     


 


Pancrelipase  4 cap  04/12/19 08:00  04/13/19 13:48





  Remigio Rome 6,000 Units Capsule  PO   4 cap





  TIDCM [facility]   Administration





     





     





     





     


 


Pantoprazole Sodium  40 mg  04/12/19 10:00  04/13/19 11:03





  Protonix -  PO   40 mg





  DAILY SAMUEL   Administration





     





     





     





     


 


Spironolactone  25 mg  04/12/19 10:00  04/13/19 11:02





  Aldactone -  PO   25 mg





  DAILY SAMUEL   Administration





     





     





     





     


 


Thiamine HCl  100 mg  04/12/19 10:00  04/13/19 13:48





  Vitamin B1 -  PO   100 mg





  DAILY SAMUEL   Administration





     





     





     





     


 


Zolpidem Tartrate  5 mg  04/12/19 20:58  04/12/19 21:43





  Ambien -  PO   5 mg





  HS PRN   Administration





  INSOMNIA   





     





     





     








 Home Medications











 Medication  Instructions  Recorded


 


Amlodipine Besylate 2.5 mg PO DAILY 04/10/19


 


Atorvastatin Ca [Lipitor] 20 mg PO HS 04/10/19


 


Esomeprazole Magnesium 40 mg PO DAILY 04/10/19


 


Gabapentin 600 mg PO DAILY 04/10/19


 


Insulin Degludec [Tresiba] 40 unit SQ BID 04/10/19


 


Lipase/Protease/Amylase [Creon Dr 5 each PO TID 04/10/19





24,000 Units Capsule]  


 


Liraglutide [Victoza -] 1.8 mg SQ DAILY@0700 04/10/19


 


Metformin HCl [Metformin HCl ER] 500 mg PO QID 04/10/19


 


Metoprolol Succinate [Toprol Xl] 50 mg PO DAILY 04/10/19


 


Spironolactone 25 mg PO DAILY 04/10/19


 


Zolpidem Tartrate [Ambien] 10 mg PO HS 04/10/19











CT of the chest : Oval shaped pleural based masslike density in the right 

midlung, laterally measuring 4x1.4cm in AP and transverse dimension compatible 

with Lipoma. Slightly displaced acute fracture in the posterior arch of the 

right 10th through 12th rib.





US of abdomen: Fatty Liver vs hepatocellular disease. mild splenomegaly, 

thickening of gallbladder wall mainly at the fundus with suggestion os small 

sludge.





HOSPITAL COURSE:





Date of Admission:04/10/19





Date of Discharge: 04/13/19





Patient is a 73 year old male with hx of alcohol abuse, sleep apnea, non-

insulin dependent diabetes, HTN, alcohol withdrawal seizure (only one in the 

past, last seizure 3 years ago), and multiple detox admissions (2018). 





#Alcohol Withdrawal: on detox protocol , completed . 





#Acute over chronic Kidney injury :Creatinine 2. 5--> 2.0 today, stable  ;

outpatient nephrologist name is Dr. Haile Del Cid at [phone]. patient will 

follow up with him. 





#Pancytopenia: due to liver disease due to etoh dependency, improving, stopped 

the AC px. 





#T2DM: continue home meds except the levemir.





#Hypertension: controlled now, continue current regimen. 





#Hyperlipidemia:on statin continue 





diabetic diet





DVT Px: NO AC since platelets are in 50s





PAtient was told to stop taking his Metformin since has an elevation of his 

creatinine.  has an appointment with the endocrinologist.  





Minutes to complete discharge: 40





Discharge Summary


Reason For Visit: ALCOHOL WITHDRAWAL SYNDROME


Current Active Problems





Alcohol withdrawal (Acute)








Condition: Stable





- Instructions


Diet, Activity, Other Instructions: 


We stopped your metformin since creatinine is elevated. 


continue rest of your home medications. LOw FAT/low sodium diet. ALso on the US 

of liver was found to have FAtty liver that's due to alcohol consumption. 

Important to stop drinking. 


Abstinence form alcohol, since can injure your liver which can cause a 

permenant damage.


Follow up with your primary care MD within a week period. Follow up with your 

nephrologist within a  week period; Dr. Haile Del Cid


Also follow up with your endocrinologist for further care and better control of 

your blood sugar. We referred you to endocrinologist  for further 

management of your blood sugar closely.





Referrals: 


Moshe Bell MD [Staff Physician] - 1 Week


Disposition: HOME





- Home Medications


Comprehensive Discharge Medication List: 


Ambulatory Orders





Amlodipine Besylate 2.5 mg PO DAILY 04/10/19 


Atorvastatin Ca [Lipitor] 20 mg PO HS 04/10/19 


Esomeprazole Magnesium 40 mg PO DAILY 04/10/19 


Gabapentin 600 mg PO DAILY 04/10/19 


Insulin Degludec [Tresiba] 40 unit SQ BID 04/10/19 


Lipase/Protease/Amylase [Creon Dr 24,000 Units Capsule] 5 each PO TID 04/10/19 


Liraglutide [Victoza -] 1.8 mg SQ DAILY@0700 04/10/19 


Metformin HCl [Metformin HCl ER] 500 mg PO QID 04/10/19 


Metoprolol Succinate [Toprol Xl] 50 mg PO DAILY 04/10/19 


Spironolactone 25 mg PO DAILY 04/10/19 


Zolpidem Tartrate [Ambien] 10 mg PO HS 04/10/19 








This patient is new to me today: No


Emergency Visit: Yes


ED Registration Date: 04/10/19


Care time: The patient presented to the Emergency Department on the above date 

and was hospitalized for further evaluation of their emergent condition.


Critical Care patient: No





- Discharge Referral


Referred to [facility] No

## 2019-04-14 LAB — URATE SERPL-SCNC: 6.5 MG/DL (ref 2.6–7.2)

## 2019-04-14 RX ADMIN — PANCRELIPASE SCH CAP: 30000; 6000; 19000 CAPSULE, DELAYED RELEASE PELLETS ORAL at 10:55

## 2019-04-14 RX ADMIN — PANTOPRAZOLE SODIUM SCH MG: 40 TABLET, DELAYED RELEASE ORAL at 10:53

## 2019-04-14 RX ADMIN — FOLIC ACID SCH MG: 1 TABLET ORAL at 10:53

## 2019-04-14 RX ADMIN — ZOLPIDEM TARTRATE PRN MG: 5 TABLET, COATED ORAL at 22:27

## 2019-04-14 RX ADMIN — INSULIN ASPART SCH UNITS: 100 INJECTION, SOLUTION INTRAVENOUS; SUBCUTANEOUS at 16:56

## 2019-04-14 RX ADMIN — GABAPENTIN SCH MG: 300 CAPSULE ORAL at 10:53

## 2019-04-14 RX ADMIN — INSULIN ASPART SCH: 100 INJECTION, SOLUTION INTRAVENOUS; SUBCUTANEOUS at 06:05

## 2019-04-14 RX ADMIN — PANCRELIPASE SCH CAP: 30000; 6000; 19000 CAPSULE, DELAYED RELEASE PELLETS ORAL at 12:28

## 2019-04-14 RX ADMIN — PANCRELIPASE SCH CAP: 30000; 6000; 19000 CAPSULE, DELAYED RELEASE PELLETS ORAL at 16:59

## 2019-04-14 RX ADMIN — AMLODIPINE BESYLATE SCH MG: 2.5 TABLET ORAL at 10:54

## 2019-04-14 RX ADMIN — INSULIN ASPART SCH UNITS: 100 INJECTION, SOLUTION INTRAVENOUS; SUBCUTANEOUS at 22:26

## 2019-04-14 RX ADMIN — INSULIN DETEMIR SCH UNITS: 100 INJECTION, SOLUTION SUBCUTANEOUS at 22:25

## 2019-04-14 RX ADMIN — ATORVASTATIN CALCIUM SCH MG: 20 TABLET, FILM COATED ORAL at 22:24

## 2019-04-14 RX ADMIN — INSULIN ASPART SCH UNITS: 100 INJECTION, SOLUTION INTRAVENOUS; SUBCUTANEOUS at 12:31

## 2019-04-14 RX ADMIN — ERYTHROMYCIN SCH APPLIC: 5 OINTMENT OPHTHALMIC at 10:55

## 2019-04-14 RX ADMIN — Medication SCH: at 10:13

## 2019-04-14 RX ADMIN — SPIRONOLACTONE SCH MG: 25 TABLET, FILM COATED ORAL at 10:53

## 2019-04-14 RX ADMIN — INSULIN DETEMIR SCH: 100 INJECTION, SOLUTION SUBCUTANEOUS at 06:04

## 2019-04-14 NOTE — PN
Teaching Attending Note


Name of Resident: Andra Hernandez





ATTENDING PHYSICIAN STATEMENT





I saw and evaluated the patient.


I reviewed the resident's note and discussed the case with the resident.


I agree with the resident's findings and plan as documented.








SUBJECTIVE:





Events noted overnight , Patient stated that had a fall a week ago and his knee 

was hurting him, with no fever or chills, bc of pain having difficulty to 

ambulate. 


OBJECTIVE:


 Vital Signs











Temperature  98 F   04/14/19 06:49


 


Pulse Rate  85   04/14/19 10:00


 


Respiratory Rate  20   04/14/19 10:00


 


Blood Pressure  151/93   04/14/19 10:00


 


O2 Sat by Pulse Oximetry (%)  98   04/13/19 20:22








GENERAL: The patient is awake, alert, and fully oriented, in NAD.


HEAD: Normal with no signs of trauma.


EYES: PERRL, extraocular movements intact, sclera anicteric, conjunctiva clear. 


ENT: Ears normal, oropharynx clear without exudates, moist mucous membranes.


NECK: Trachea midline, full range of motion, supple. 


LUNGS: Breath sounds equal, clear to auscultation bilaterally, no wheezes, no 

crackles, no accessory muscle use. 


HEART: Regular rate and rhythm, S1, S2 without murmur, rub or gallop.


ABDOMEN: Soft, nontender, nondistended, normoactive bowel sounds, no guarding, 

no rebound. 


EXTREMITIES: 2+ pulses, warm, well-perfused, mild swelling of right knee. 


NEUROLOGICAL: Cranial nerves II through XII grossly intact. Normal speech, gait 

not observed.


PSYCH: Normal mood, normal affect.


SKIN: Warm, dry, normal turgor, no rashes or lesions noted.


 CBCD











WBC  5.0 K/mm3 (4.0-10.0)   04/13/19  06:45    


 


RBC  3.62 M/mm3 (4.00-5.60)  L  04/13/19  06:45    


 


Hgb  10.9 GM/dL (11.7-16.9)  L  04/13/19  06:45    


 


Hct  33.1 % (35.4-49)  L  04/13/19  06:45    


 


MCV  91.4 fl (80-96)   04/13/19  06:45    


 


MCHC  32.9 g/dl (32.0-35.9)   04/13/19  06:45    


 


RDW  18.4 % (11.9-15.9)  H  04/13/19  06:45    


 


Plt Count  71 K/MM3 (134-434)  L D 04/13/19  06:45    


 


MPV  8.9 fl (7.5-11.1)  D 04/13/19  06:45    








 CMP











Sodium  137 mmol/L (136-145)   04/13/19  06:45    


 


Potassium  4.1 mmol/L (3.5-5.1)   04/13/19  06:45    


 


Chloride  106 mmol/L ()   04/13/19  06:45    


 


Carbon Dioxide  25 mmol/L (21-32)   04/13/19  06:45    


 


Anion Gap  7 MMOL/L (8-16)  L  04/13/19  06:45    


 


BUN  18 mg/dL (7-18)   04/13/19  06:45    


 


Creatinine  2.0 mg/dL (0.55-1.3)  H  04/13/19  06:45    


 


Creat Clearance w eGFR  33.10  (>60)   04/13/19  06:45    


 


Random Glucose  190 mg/dL ()  H  04/13/19  06:45    


 


Calcium  8.3 mg/dL (8.5-10.1)  L  04/13/19  06:45    


 


Total Bilirubin  0.6 mg/dL (0.2-1)   04/12/19  06:30    


 


AST  83 U/L (15-37)  H  04/12/19  06:30    


 


ALT  29 U/L (13-61)   04/12/19  06:30    


 


Alkaline Phosphatase  159 U/L ()  H  04/12/19  06:30    


 


Total Protein  6.5 g/dl (6.4-8.2)   04/12/19  06:30    


 


Albumin  3.0 g/dl (3.4-5.0)  L  04/12/19  06:30    








 Current Medications











Generic Name Dose Route Start Last Admin





  Trade Name Freq  PRN Reason Stop Dose Admin


 


Amlodipine Besylate  2.5 mg  04/12/19 10:00  04/14/19 10:54





  Norvasc -  PO   2.5 mg





  DAILY SAMUEL   Administration





     





     





     





     


 


Atorvastatin Calcium  20 mg  04/11/19 22:00  04/13/19 21:19





  Lipitor -  PO   20 mg





  HS SAMUEL   Administration





     





     





     





     


 


Erythromycin  1 applic  04/11/19 19:00  04/14/19 10:55





  Erythromycin 0.5% Eye Ointment  OD   1 applic





  DAILY SAMUEL   Administration





     





     





     





     


 


Folic Acid  1 mg  04/12/19 10:00  04/14/19 10:53





  Folic Acid -  PO   1 mg





  DAILY SAMUEL   Administration





     





     





     





     


 


Gabapentin  600 mg  04/12/19 10:00  04/14/19 10:53





  Neurontin -  PO   600 mg





  DAILY SAMUEL   Administration





     





     





     





     


 


Insulin Aspart  1 vial  04/11/19 16:30  04/14/19 12:31





  Novolog Vial Sliding Scale -  SQ   6 units





  [facility]   Administration





     





     





  Protocol   





     


 


Insulin Detemir  40 units  04/11/19 22:00  04/14/19 06:04





  Levemir Vial  SQ   Not Given





  BID@0700,2200 [facility]   





     





     





     





     


 


Metoprolol Succinate  50 mg  04/12/19 10:00  04/14/19 10:54





  Toprol Xl -  PO   50 mg





  DAILY SAMUEL   Administration





     





     





     





     


 


Pancrelipase  4 cap  04/12/19 08:00  04/14/19 12:28





  Remigio Rome 6,000 Units Capsule  PO   4 cap





  TIDCM [facility]   Administration





     





     





     





     


 


Pantoprazole Sodium  40 mg  04/12/19 10:00  04/14/19 10:53





  Protonix -  PO   40 mg





  DAILY SAMUEL   Administration





     





     





     





     


 


Spironolactone  25 mg  04/12/19 10:00  04/14/19 10:53





  Aldactone -  PO   25 mg





  DAILY [facility]   Administration





     





     





     





     


 


Thiamine HCl  100 mg  04/12/19 10:00  04/13/19 13:48





  Vitamin B1 -  PO   100 mg





  DAILY SAMUEL   Administration





     





     





     





     


 


Zolpidem Tartrate  5 mg  04/12/19 20:58  04/13/19 21:33





  Ambien -  PO   5 mg





  HS PRN   Administration





  INSOMNIA   





     





     





     








 Home Medications











 Medication  Instructions  Recorded


 


Amlodipine Besylate 2.5 mg PO DAILY 04/10/19


 


Atorvastatin Ca [Lipitor] 20 mg PO HS 04/10/19


 


Esomeprazole Magnesium 40 mg PO DAILY 04/10/19


 


Gabapentin 600 mg PO DAILY 04/10/19


 


Insulin Degludec [Tresiba] 40 unit SQ BID 04/10/19


 


Lipase/Protease/Amylase [Creon Dr 5 each PO TID 04/10/19





24,000 Units Capsule]  


 


Liraglutide [Victoza -] 1.8 mg SQ DAILY@0700 04/10/19


 


Metoprolol Succinate [Toprol Xl] 50 mg PO DAILY 04/10/19


 


Spironolactone 25 mg PO DAILY 04/10/19


 


Zolpidem Tartrate [Ambien] 10 mg PO HS 04/10/19


 


Thiamine HCl [Vitamin B1 -] 100 mg PO DAILY  tablet 04/13/19

















CT of the chest : Oval shaped pleural based masslike density in the right 

midlung, laterally measuring 4x1.4cm in AP and transverse dimension compatible 

with Lipoma. Slightly displaced acute fracture in the posterior arch of the 

right 10th through 12th rib.





US of abdomen: Fatty Liver vs hepatocellular disease. mild splenomegaly, 

thickening of gallbladder wall mainly at the fundus with suggestion os small 

sludge.








ASSESSMENT AND PLAN:


Patient is a 73 year old male with hx of alcohol abuse, sleep apnea, non-

insulin dependent diabetes, HTN, alcohol withdrawal seizure (only one in the 

past, last seizure 3 years ago), and multiple detox admissions (2018). 





Events noted. Patient was having right knee pain s/p fall a week ago.





#Right knee pain with mild swelling: xray of the knee; mild focal prominence of 

prepatellar soft tissues and partial effacement of ft in Hoffa's fat pad could 

be postraumatic/or inflammatory. Ortho consult appreciated.  No antibiotics are 

needed at this time. ortho on the case, will monitor the patient.





#Alcohol Withdrawal continues: overnight patient was having hallucination, will 

continue moderate librium detox 





#Acute over chronic Kidney injury :Creatinine 2. 5--> 2.0--2.0 today, stable; 

outpatient nephrologist name is Dr. Haile Del Cid at [phone]. patient will 

follow up with him. 





#Pancytopenia: due to liver disease due to etoh dependency, improving, stopped 

the AC px. 





#T2DM: continue home meds except the levemir.





#Hypertension: controlled now, continue current regimen. 





#Hyperlipidemia:on statin continue 





diabetic diet





DVT Px: NO AC since platelets are in 50s





Patient was told to stop taking his Metformin since has an elevation of his 

creatinine.  has an appointment with the endocrinologist.

## 2019-04-14 NOTE — CONSULT
Consult - text type





- Consultation


Consultation Note: 





 ORTHOPEDIC SURGERY CONSULTATION NOTE


                             Department of Orthopedic Surgery





HISTORY OF PRESENT ILLNESS





Mr. Amezcua is a 73 year old male with a pmhx of alcohol abuse, sleep apnea, non-

insulin dependent diabetes, HTN, alcohol withdrawal seizure (only one in the 

past, last seizure 3 years ago), and multiple detox admissions (2018) presents 

for detox sent by one of his outpatient physicians. The orthopedic service was 

consulted for rule out septic right knee as the patient spiked a fever of 

100.3F yesterday before discharge. The patient states he had fallen about a 

week ago onto his knees. The patient notes improved pain in his right knee this 

morning and states he is able to lift and bend his knee with no pain. Denies 

any other injuries. Denies numbness, tingling or other constitutional 

complaints.Denies tobacco use, drug use, and endorses alcohol abuse. Patient 

able to ambulate without assistance with no pain.





FAMILY HISTORY





non-contributory





REVIEW OF SYMPTOMS 





A twelve-point review of systems was performed and was negative except as noted 

in HPI.





PHYSICAL EXAM





Constitutional: Alert and oriented to person, place, and time. Appears well-

developed and well-nourished. No acute distress, appropriate mood and affect. 





Right Upper Extremity: Skin warm, dry, and intact; no lesions, rashes or ulcers 

noted. Muscle mass equal and symmetric to contralateral side. No atrophy noted. 

No masses or effusions noted. No tenderness to palpation all joints; nontender 

throughout rest of extremity. Full passive and active ROM, free from pain. 

Joints stable with no pathologic laxity. M/R/U/MSK/AX motor intact; SILT 

distally; 2+ radial pulses; Cap refill brisk. Tone and reflexes normal. 





Left Upper Extremity: Skin warm, dry, and intact; no lesions, rashes or ulcers 

noted. Muscle mass equal and symmetric to contralateral side. No atrophy noted. 

No masses or effusions noted. No tenderness to palpation all joints; nontender 

throughout rest of extremity. Full passive and active ROM, free from pain. 

Joints stable with no pathologic laxity. M/R/U/MSK/AX motor intact; SILT 

distally; 2+ radial pulses; Cap refill brisk. Tone and reflexes normal.





Right Lower Extremity: Skin warm, dry, and intact; no lesions, rashes or ulcers 

noted. No effusion noted. Muscle mass equal and symmetric to contralateral 

side. No atrophy noted. No masses or effusions noted. Tenderness to palpation 

over the medial joint line; nontender throughout rest of extremity. No cords or 

calf tenderness


No significant calf/ankle edema. Full passive and active ROM, free from pain. 

Joints stable with no pathologic laxity. EHL/TA/GS motor intact; SILT distally; 

2+ DP pulses; Cap refill brisk. Tone and reflexes normal. Able to SLR. Negative 

log roll.





Left Lower Extremity: Skin warm, dry, and intact; no lesions, rashes or ulcers 

noted. Muscle mass equal and symmetric to contralateral side. No atrophy noted. 

No masses or effusions noted. No tenderness to palpation all joints; nontender 

throughout rest of extremity. No cords or calf tenderness


No significant calf/ankle edema. Full passive and active ROM, free from pain. 

Joints stable with no pathologic laxity. EHL/TA/GS motor intact; SILT distally; 

2+ DP pulses; Cap refill brisk. Tone and reflexes normal. Able to SLR. Negative 

log roll.





 


Active Problems











Problem Status Category Onset


 


Alcohol withdrawal Acute Medical 








Social History





Smoking history                  Never smoked


Hx Alcohol Use                   Yes: 2 DAYS AGO





Allergies











Allergy/AdvReac Type Severity Reaction Status Date / Time


 


No Known Allergies Allergy   Verified 06/11/14 21:18








Active Medications











Generic Name Dose Route Start Last Admin





  Trade Name Domingoq  PRN Reason Stop Dose Admin


 


Amlodipine Besylate  2.5 mg  04/12/19 10:00  04/13/19 11:03





  Norvasc -  PO   2.5 mg





  DAILY SAMUEL   Administration





     





     





     





     


 


Atorvastatin Calcium  20 mg  04/11/19 22:00  04/13/19 21:19





  Lipitor -  PO   20 mg





  HS SAMUEL   Administration





     





     





     





     


 


Erythromycin  1 applic  04/11/19 19:00  04/13/19 11:42





  Erythromycin 0.5% Eye Ointment  OD   1 applic





  DAILY SAMUEL   Administration





     





     





     





     


 


Folic Acid  1 mg  04/12/19 10:00  04/13/19 11:02





  Folic Acid -  PO   1 mg





  DAILY SAMUEL   Administration





     





     





     





     


 


Gabapentin  600 mg  04/12/19 10:00  04/13/19 11:03





  Neurontin -  PO   600 mg





  DAILY SAMUEL   Administration





     





     





     





     


 


Insulin Aspart  1 vial  04/11/19 16:30  04/14/19 06:05





  Novolog Vial Sliding Scale -  SQ   Not Given





  [facility]   





     





     





  Protocol   





     


 


Insulin Detemir  40 units  04/11/19 22:00  04/14/19 06:04





  Levemir Vial  SQ   Not Given





  BID@0700,2200 [facility]   





     





     





     





     


 


Metoprolol Succinate  50 mg  04/12/19 10:00  04/13/19 11:02





  Toprol Xl -  PO   50 mg





  DAILY SAMUEL   Administration





     





     





     





     


 


Pancrelipase  4 cap  04/12/19 08:00  04/13/19 18:50





  Remigio Rome 6,000 Units Capsule  PO   4 cap





  TIDCM [facility]   Administration





     





     





     





     


 


Pantoprazole Sodium  40 mg  04/12/19 10:00  04/13/19 11:03





  Protonix -  PO   40 mg





  DAILY SAMUEL   Administration





     





     





     





     


 


Spironolactone  25 mg  04/12/19 10:00  04/13/19 11:02





  Aldactone -  PO   25 mg





  DAILY SAMUEL   Administration





     





     





     





     


 


Thiamine HCl  100 mg  04/12/19 10:00  04/13/19 13:48





  Vitamin B1 -  PO   100 mg





  DAILY SAMUEL   Administration





     





     





     





     


 


Zolpidem Tartrate  5 mg  04/12/19 20:58  04/13/19 21:33





  Ambien -  PO   5 mg





  HS PRN   Administration





  INSOMNIA   





     





     





     








Vital Signs (last)











Temp Pulse Resp BP Pulse Ox


 


 98 F   73   18   149/63   98 


 


 04/14/19 06:49  04/13/19 20:20  04/13/19 20:20  04/13/19 20:20  04/13/19 20:22








Intake and Output











 04/12/19 04/13/19 04/14/19





 23:59 23:59 23:59


 


Intake Total 1000 600 50


 


Balance 1000 600 50


 


Intake:   


 


  IVPB   50


 


  Oral 1000 600 


 


Other:   


 


  Voiding Method Toilet Toilet Toilet


 


  # Unmeasured Voids   


 


    Void 2 2 


 


  Bowel Movement No No 








Laboratory





 04/13/19 06:45 





 04/13/19 06:45 











IMAGING





I personally reviewed all radiographs. They demonstrate mild to moderate 

osteoarthritis. No fractures, bony lesions, or effusions seen.





ASSESSMENT AND PLAN





Mr. Amezcua is a 73 year old female presenting with right knee osteoarthritis - 

no signs of septic joint. We have reviewed the imaging and clinical findings in 

detail, as well as their potential implications. After appropriate informed 

discussion, we agreed on the following plan:








- Pain Control


- DVT Prophylaxis


- WBAT B/L LE / Physical therapy


- Continue medical management


- No further orthopedic intervention at this time.





All questions were answered. Thank you for involving our team in the care of 

this patient. Please have patient follow up in our office in 1-2 weeks [phone].

## 2019-04-14 NOTE — CONS
INFECTIOUS DISEASE CONSULTATION

 

DATE OF CONSULTATION:  

 

DATE OF DICTATION:  04/14/2019 

 

REQUESTED BY:  The hospitalist service

 

HISTORY OF PRESENT ILLNESS:  This is a 73-year-old man who was admitted on the 10th

with alcohol withdrawal who per his family was drinking 2 bottles of wine daily.  He

was noted to be leukopenic as well and thrombocytopenic which was all felt to be

secondary to his heavy alcohol use.  He had no fevers.  Last night the resident was

called because he had a rectal temp of 100.3 and apparently that he was reported to

be shaking.  He was felt by the overnight resident to have a pain and swelling of his

right knee and he was given vancomycin and ceftriaxone after cultures were drawn, and

we are asked to see him for further evaluation.  

 

Currently he is awake and alert.  He thinks he is in [place], but he knows the date

2019 and he knows the president.  He denies any cough.  He denies any abdominal pain

or chest pain.  He has no dysuria.  He states that he fell several weeks ago and he

injured his right knee at which time it was quite swollen and that it has improved,

but there is still some residual pain.  

 

PAST MEDICAL HISTORY:  Notable for history of alcohol use, sleep apnea,

noninsulin-dependent diabetes, hypertension and alcohol withdrawal seizures.  He has

had one in the past.  Last seizure was 3 years ago.  He has had multiple detox

admissions.  

 

SURGICAL HISTORY:  Notable for hernia repair.

 

SOCIAL HISTORY:  He is a retired , former smoker, quit 30 years

ago, and his last drink was 2 days prior to admission.  No history of substance use.

 

FAMILY HISTORY:  Notable for lung cancer in his father and 2 brothers, and mother is

with Alzheimer's disease.  

 

ALLERGIES:  He has no known drug allergies.

 

MEDICATIONS AT HOME:  Include amlodipine, atorvastatin, omeprazole, gabapentin,

insulin, pancreatic enzyme, Victoza, metformin, Toprol XL, spironolactone, Cialis and

Ambien.  

 

REVIEW OF SYSTEMS:  He currently reports he feels well and that he has some residual

right knee pain.

 

PHYSICAL EXAMINATION:

Vital Signs:  His T-max was 100.3 rectally.  His current temp is 98.  Blood pressure

is 149/63, respiratory rate of 18.  He is saturating 98% on room air.

HEENT:  He is normocephalic.  His eyes are anicteric.  

Neck:  Supple.

Lungs:  Clear to auscultation.

Heart:  Regular rate and rhythm.

Abdomen:  Soft, nontender.

Extremities:  Without edema.  He has some minimal pain on palpation of the right

knee.  There is no erythema or warmth.  He has full range of motion.  There is no

associated effusion.  The ankle and the hip joints on the right and left side have

full range of motion as well.  He has no phlebitis on exam.

 

LABORATORIES:  Notable for white count of 5000, hemoglobin 10.9.  Platelets are 71. 

Chemistries:  BUN is 18 and creatinine 2, AST of 83, alkaline phosphatase of 159 and

albumin of 3.  Urinalysis on admission had 1 white cell, 1+ protein.  Blood cultures

are pending.  A chest x-ray showed a mass that on CAT scan was a confirmed lipoma

with fracture of the right 10th through 12th ribs.  He has had an x-ray of his knee

that is notable for mild focal prominence of the prepatellar soft tissues, partial

effacement of the fat which can be secondary to trauma which he has had.

 

SUMMARY:  This is a 73-year-old man admitted with fever.  He has no rash.  His lungs

are clear.  There are no signs of knee erythema or effusion to suggest a septic

joint.  He has no signs of phlebitis.  He was treated with vancomycin and Rocephin

already.  At this point I would observe him off antibiotics and follow up his blood

cultures.  His platelets and white count appear to be improving off of alcohol, but

he still appears to be intermittently confused, and lastly he has some CKD, which

apparently he is followed by a nephrologist in the community.

 

 

SILKE JACKSON M.D.

 

[id number]

DD: 04/14/2019 10:59

DT: 04/14/2019 11:32

Job #:  [id number]

## 2019-04-14 NOTE — PN
Physical Exam: 


SUBJECTIVE: Patient seen and examined this AM. Overnight patient experienced 

some rigors with temp 100.3 and right knee swelling. This AM, he says the pain 

is a 6/10 and is the same as last night.








OBJECTIVE:





 Vital Signs











 Period  Temp  Pulse  Resp  BP Sys/Nieves  Pulse Ox


 


 Last 24 Hr  98 F-100.3 F  64-74  18-20  121-149/54-71  98











GENERAL: Sleepy but arousable, NAD


HEAD: NCAT


EYES: PERRL


LUNGS: Diminished breath sounds at the bases, no wheezes, no crackles


HEART: Regular rate and rhythm, S1, S2 without murmur


ABDOMEN: Soft, nontender, nondistended, + bowel sounds, no guarding


EXTREMITIES: Mild edema over the Right Knee with minimal increased warmth, No 

overlying erythema, no tenderness to palpation


NEUROLOGICAL: Cranial nerves II through XII grossly intact. 


SKIN: Warm, dry








 Laboratory Results - last 24 hr











  04/13/19 04/13/19 04/13/19





  10:55 17:25 21:21


 


ESR   


 


POC Glucometer  273  289  287


 


Lactic Acid   


 


Uric Acid   


 


C-Reactive Protein   














  04/14/19 04/14/19 04/14/19





  02:30 02:30 02:42


 


ESR   38 H 


 


POC Glucometer   


 


Lactic Acid    0.6


 


Uric Acid  6.5  


 


C-Reactive Protein  < 0.3  














  04/14/19





  06:23


 


ESR 


 


POC Glucometer  148


 


Lactic Acid 


 


Uric Acid 


 


C-Reactive Protein 








Active Medications





Amlodipine Besylate (Norvasc -)  2.5 mg PO DAILY [facility]


   Last Admin: 04/13/19 11:03 Dose:  2.5 mg


Atorvastatin Calcium (Lipitor -)  20 mg PO HS [facility]


   Last Admin: 04/13/19 21:19 Dose:  20 mg


Erythromycin (Erythromycin 0.5% Eye Ointment)  1 applic OD DAILY [facility]


   Last Admin: 04/13/19 11:42 Dose:  1 applic


Folic Acid (Folic Acid -)  1 mg PO DAILY [facility]


   Last Admin: 04/13/19 11:02 Dose:  1 mg


Gabapentin (Neurontin -)  600 mg PO DAILY [facility]


   Last Admin: 04/13/19 11:03 Dose:  600 mg


Insulin Aspart (Novolog Vial Sliding Scale -)  1 vial SQ [facility]; Protocol


   Last Admin: 04/14/19 06:05 Dose:  Not Given


Insulin Detemir (Levemir Vial)  40 units SQ BID@0700,2200 [facility]


   Last Admin: 04/14/19 06:04 Dose:  Not Given


Metoprolol Succinate (Toprol Xl -)  50 mg PO DAILY [facility]


   Last Admin: 04/13/19 11:02 Dose:  50 mg


Pancrelipase (Creon Dr 6,000 Units Capsule)  4 cap PO TIDCM [facility]


   Last Admin: 04/13/19 18:50 Dose:  4 cap


Pantoprazole Sodium (Protonix -)  40 mg PO DAILY [facility]


   Last Admin: 04/13/19 11:03 Dose:  40 mg


Spironolactone (Aldactone -)  25 mg PO DAILY [facility]


   Last Admin: 04/13/19 11:02 Dose:  25 mg


Thiamine HCl (Vitamin B1 -)  100 mg PO DAILY [facility]


   Last Admin: 04/13/19 13:48 Dose:  100 mg


Zolpidem Tartrate (Ambien -)  5 mg PO HS PRN


   PRN Reason: INSOMNIA


   Last Admin: 04/13/19 21:33 Dose:  5 mg





IMAGING:


-CXR: No sign of infiltrate or failure. Prominent right hilum. Right pleural-

based mass/density. There is no sign of bone destruction. Further imaging with 

CT is suggested. There are no prior studies for comparison. 


-CT Chest w/o contrast: Oval-shaped pleural-based masslike density in the right 

midlung, laterally measuring 4 x 1.4 cm in AP and transverse dimension 

compatible with a lipoma. Otherwise, no acute lung disease or pulmonary nodules 

identified. No pneumothorax or pleural effusion identified. Slightly displaced 

acute fracture in the posterior arch of the right 10th through 12th rib. 

Correlate clinically. 


-Abdomen US: Fatty liver versus hepatocellular disease. Please correlate with 

liver enzymes. Thickening of the gallbladder wall mainly at the fundus with 

suggestion of a small sludge layering posteriorly and without evidence of 

gallstones or pericholecystic free fluid. Mild splenomegaly 


-Right Knee XRay: Mild focal prominence of prepatellar soft tissues and partial 

effacement of fat in Hoffa's fat pad could be posttraumatic and/or 

inflammatory. No significant suprapatellar effusion. No evidence of acute 

fracture or dislocation in the right knee. 


-EKG: Sinus rhythm with short [place], VR 97, QTc 431 








ASSESSMENT/PLAN:


74 y/o M with PMHx EtOH abuse, sleep apnea, DM, HTN, alcohol withdrawal seizure 

(last episode 3 years ago), multiple detox admissions, admitted for detox.





#Left Knee Swelling


-ESR, CRP noted above; lactic acid 0.6


-Right knee XRay noted above


-Blood cx pending


-Received one dose Vancomycin and Ceftriaxone overnight; Will observe off abx 

for now


-Ortho (Dr. Deutsch) Consulted, Appreciate rec's


-ID (Dr. Olivas) Consulted, Appreciate rec's





#Alcohol Withdrawal


-Lorazepam protocol


-Addiction med (Dr. Chi) consulted, Appreciate Rec's


-Thiamine 100mg, Folic acid 1 mg daily


-Fall and seizure precautions





#Pancytopenia


-In the setting of alcohol use disorder and hepatic congestion/liver cirrhosis


-Hold antiplatelet agents in the setting of downtrending PLT


-Monitor CBC


-Further workup of pancytopenia as outpatient





#IDDM


-A1c 7.4


-Detemir 40u sq BID


-ISS BGMs ACHS





#HTN


-Metoprolol succinate 50 daily, spironolactone 25 qd, amlodipine 2.5 qd





#HLD


-Atorvastatin





#CKD


-Stable, at baseline as per nephrologist (Dr. Haile Del Cid)





#FEN


-Not on standing fluids


-Lytes WNL


-Diabetic diet





#PPx


-DVT: SCDs














Visit type





- Emergency Visit


Emergency Visit: Yes


ED Registration Date: 04/10/19


Care time: The patient presented to the Emergency Department on the above date 

and was hospitalized for further evaluation of their emergent condition.





- New Patient


This patient is new to me today: Yes


Date on this admission: 04/14/19





- Critical Care


Critical Care patient: No





- Discharge Referral


Referred to [facility] No

## 2019-04-14 NOTE — PN
Progress Note (short form)





- Note


Progress Note: 





ID consult dictated





imp/reccd





72 yo man admitted 4/10 with etoh withdrawal, he was drinking 2 bottles of wine 

daily per his wife


last night he was noted to be agitated, reported shivering with temp 100.3


seen by night physician who felt right knee was warm





given vancomycin and rocephin





this am he is alert


thinks he is in [place] but knows the date 2019 and president


reports falling a couple of weeks ago, right knee was swollen then but is 

improved, still some residual pain (has rib fractures too)


no dysuria


no cough





no phlebitis on exam


no rash


lungs are clear


no signs knee erythema or effusion, FROM 


no phlebitis





would observe off antibiotics at this time


f/u blood cultures























Problem List





- Problems


(1) Fever


Code(s): R50.9 - FEVER, UNSPECIFIED   





(2) Alcohol withdrawal


Code(s): F10.239 - ALCOHOL DEPENDENCE WITH WITHDRAWAL, UNSPECIFIED   





(3) CKD (chronic kidney disease)


Code(s): N18.9 - CHRONIC KIDNEY DISEASE, UNSPECIFIED

## 2019-04-14 NOTE — PN
Progress Note (short form)





- Note


Progress Note: 





Paged for Pt. having a temp to 100.3 and reportedly having rigors. On physical 

exam Pt. stated immmediately as I entered the the room that he does not want to 

speak to me and refused to answer any questions or even allow me to approach 

him. RN states that wife was contacted and is on her way. Pt. from initial 

impression did not appear septic. Ordered a 0.5mg IM PRN Ativan for agitation. 

Will await arrival of wife. VS: 100.3 149/63 HR: 73. Right knee X-ray pending 

read, per my read nothing grossly remarkable. Upon reexamination with wife at 

bedside, she confirmed that P. is usually pleasant at baseline and that his 

increased paranoia is a new finding. Pt.'s right knee is swollen compared to 

the left, erythematous and warmer to touch than left knee. Pt, did have rigors 

on examination. ESR, CRP, Arthrocentesis, lactic acid, Blood cultures, 

Gonoccocal and Chlamydia PCR sent. Pt. started on Vancomycin and Ceftriaxone.

## 2019-04-15 LAB
ANION GAP SERPL CALC-SCNC: 6 MMOL/L (ref 8–16)
BUN SERPL-MCNC: 22 MG/DL (ref 7–18)
CALCIUM SERPL-MCNC: 8.1 MG/DL (ref 8.5–10.1)
CHLORIDE SERPL-SCNC: 105 MMOL/L (ref 98–107)
CO2 SERPL-SCNC: 28 MMOL/L (ref 21–32)
CREAT SERPL-MCNC: 2.2 MG/DL (ref 0.55–1.3)
GLUCOSE SERPL-MCNC: 254 MG/DL (ref 74–106)
POTASSIUM SERPLBLD-SCNC: 4.2 MMOL/L (ref 3.5–5.1)
SODIUM SERPL-SCNC: 138 MMOL/L (ref 136–145)

## 2019-04-15 RX ADMIN — GABAPENTIN SCH MG: 300 CAPSULE ORAL at 09:52

## 2019-04-15 RX ADMIN — INSULIN ASPART SCH UNITS: 100 INJECTION, SOLUTION INTRAVENOUS; SUBCUTANEOUS at 16:50

## 2019-04-15 RX ADMIN — FOLIC ACID SCH MG: 1 TABLET ORAL at 09:52

## 2019-04-15 RX ADMIN — ATORVASTATIN CALCIUM SCH MG: 20 TABLET, FILM COATED ORAL at 21:21

## 2019-04-15 RX ADMIN — PANCRELIPASE SCH CAP: 30000; 6000; 19000 CAPSULE, DELAYED RELEASE PELLETS ORAL at 12:20

## 2019-04-15 RX ADMIN — ZOLPIDEM TARTRATE PRN MG: 5 TABLET, COATED ORAL at 21:21

## 2019-04-15 RX ADMIN — Medication SCH MG: at 12:16

## 2019-04-15 RX ADMIN — PANCRELIPASE SCH CAP: 30000; 6000; 19000 CAPSULE, DELAYED RELEASE PELLETS ORAL at 17:27

## 2019-04-15 RX ADMIN — INSULIN ASPART SCH UNITS: 100 INJECTION, SOLUTION INTRAVENOUS; SUBCUTANEOUS at 11:35

## 2019-04-15 RX ADMIN — SPIRONOLACTONE SCH MG: 25 TABLET, FILM COATED ORAL at 09:52

## 2019-04-15 RX ADMIN — INSULIN ASPART SCH UNITS: 100 INJECTION, SOLUTION INTRAVENOUS; SUBCUTANEOUS at 21:24

## 2019-04-15 RX ADMIN — INSULIN ASPART SCH: 100 INJECTION, SOLUTION INTRAVENOUS; SUBCUTANEOUS at 06:01

## 2019-04-15 RX ADMIN — AMLODIPINE BESYLATE SCH MG: 2.5 TABLET ORAL at 09:52

## 2019-04-15 RX ADMIN — INSULIN DETEMIR SCH: 100 INJECTION, SOLUTION SUBCUTANEOUS at 06:01

## 2019-04-15 RX ADMIN — ERYTHROMYCIN SCH APPLIC: 5 OINTMENT OPHTHALMIC at 09:52

## 2019-04-15 RX ADMIN — PANCRELIPASE SCH CAP: 30000; 6000; 19000 CAPSULE, DELAYED RELEASE PELLETS ORAL at 08:25

## 2019-04-15 RX ADMIN — PANTOPRAZOLE SODIUM SCH MG: 40 TABLET, DELAYED RELEASE ORAL at 09:52

## 2019-04-15 RX ADMIN — INSULIN DETEMIR SCH UNITS: 100 INJECTION, SOLUTION SUBCUTANEOUS at 21:22

## 2019-04-15 RX ADMIN — LIDOCAINE SCH PATCH: 50 PATCH TOPICAL at 16:52

## 2019-04-15 NOTE — DS
Physical Exam: 


SUBJECTIVE: Patient seen and examined at bedside this morning. No acute events 

overnight. Patient has no new complaints. 








OBJECTIVE:





 Vital Signs











Temperature  98.2 F   04/15/19 13:05


 


Pulse Rate  73   04/15/19 13:05


 


Respiratory Rate  17   04/15/19 13:05


 


Blood Pressure  145/64   04/15/19 13:05


 


O2 Sat by Pulse Oximetry (%)  99   04/15/19 09:00











PHYSICAL EXAM





GENERAL: The patient is awake, alert, and fully oriented, in no acute distress.


HEAD: Normal with no signs of trauma.


EYES: PERRLA, EOMI, sclera anicteric, conjunctiva clear. 


ENT: oropharynx clear without exudates, moist mucous membranes.


NECK: Trachea midline, full range of motion, supple. 


LUNGS: Breath sounds equal, clear to auscultation bilaterally.


HEART: Regular rate and rhythm, S1, S2 without murmur, rub or gallop.


ABDOMEN: Soft, nontender, nondistended, normoactive bowel sounds.


EXTREMITIES: 2+ pulses, warm, well-perfused, no edema. 


NEUROLOGICAL: Cranial nerves II through XII grossly intact. Normal speech, 

normal gait. Motor strength 5/5, sensation intact. 


PSYCH: Normal mood, normal affect.


SKIN: Warm, dry, normal turgor, no rashes or lesions noted








LABS


 Laboratory Results - last 24 hr











  04/14/19 04/14/19 04/15/19





  16:55 21:28 05:52


 


POC Glucometer  306  290  85














  04/15/19





  11:25


 


POC Glucometer  322











HOSPITAL COURSE:





Date of Admission:04/10/19





Date of Discharge: 04/15/19





Patient is a 73 year old male with past medical history of alcohol abuse, sleep 

apnea, DM, HTN, alcohol withdrawal seizure (last episode 3 years ago), and 

multiple detox admissions, brought in for detox by one of his outpatient 

doctors. Patient was initally started on Ativan protocol and Addiction 

specialist consulted. PAtient continued to have mild symptoms of withdrawal and 

was started on Librium protocol. Patient was also noted to have elevated 

Creatinine. NEphrologist (Dr. Haile Del Cid) were contacted, and previous records 

showed patient is at his baseline, and known to have CKD. Patient remained 

stable throughout his hospital stay. Patient was advised to follow up with PCP 

and nephrologist.





Minutes to complete discharge: 38





Discharge Summary


Reason For Visit: ALCOHOL WITHDRAWAL SYNDROME


Current Active Problems





Alcohol withdrawal (Acute)


CKD (chronic kidney disease) (Acute)


Fever (Acute)








Condition: Stable





- Instructions


Diet, Activity, Other Instructions: 


Your visit


You were admitted for detox due to heavy alcohol use. Ultrasound of the belly 

showed that you have a fatty liver. This is due to alcohol consumption. It is 

very important that your stop drinking. Eat a low fat/ low salt diet. 





Medications


Please STOP taking Metformin since creatinine is elevated. 


Follow-up with the endocrinologist for further care and better control of your 

blood sugar.


Continue rest of your home medications. 





Follow-up


-Please follow up with your primary care doctor within 1 week.


-Please follow up with orthopedic surgeon (Dr. Deutsch) within 1 week. 


-Please follow up with the endocrinologist (Dr. Bell) within 1 week.


-Please follow up with your nephrologist (Dr. Del Cid) within 1 week.





Additional info


Call 911 or go the ED if with any worsening fever, chills, headache, nausea, 

vomiting, chest pain, shortness of breath, palpitations, belly pain, bloody 

stools or any new concerns noted. 








Referrals: 


Lavell Deutsch DO [Staff Physician] - 1 Week


Moshe Bell MD [Staff Physician] - 1 Week


Haile Del Cid [Other]


Disposition: HOME





- Home Medications


Comprehensive Discharge Medication List: 


Ambulatory Orders





Amlodipine Besylate 2.5 mg PO DAILY 04/10/19 


Atorvastatin Ca [Lipitor] 20 mg PO HS 04/10/19 


Esomeprazole Magnesium 40 mg PO DAILY 04/10/19 


Gabapentin 600 mg PO DAILY 04/10/19 


Insulin Degludec [Tresiba] 40 unit SQ BID 04/10/19 


Lipase/Protease/Amylase [Creon Dr 24,000 Units Capsule] 5 each PO TID 04/10/19 


Liraglutide [Victoza -] 1.8 mg SQ DAILY@0700 04/10/19 


Metoprolol Succinate [Toprol Xl] 50 mg PO DAILY 04/10/19 


Spironolactone 25 mg PO DAILY 04/10/19 


Zolpidem Tartrate [Ambien] 10 mg PO HS 04/10/19 


Thiamine HCl [Vitamin B1 -] 100 mg PO DAILY  tablet 04/13/19 








This patient is new to me today: Yes


Date on this admission: 04/16/19


Emergency Visit: Yes


ED Registration Date: 04/10/19


Care time: The patient presented to the Emergency Department on the above date 

and was hospitalized for further evaluation of their emergent condition.


Critical Care patient: No





- Discharge Referral


Referred to [facility] No

## 2019-04-15 NOTE — PN
Teaching Attending Note


Name of Resident: Janette Monsivais





ATTENDING PHYSICIAN STATEMENT





I saw and evaluated the patient.


I reviewed the resident's note and discussed the case with the resident.


I agree with the resident's findings and plan as documented.








SUBJECTIVE:


patient is better today with no acute distress. feels better, ambulating 

better. 


OBJECTIVE:


 Vital Signs











Temperature  98.2 F   04/15/19 13:05


 


Pulse Rate  73   04/15/19 13:05


 


Respiratory Rate  17   04/15/19 13:05


 


Blood Pressure  145/64   04/15/19 13:05


 


O2 Sat by Pulse Oximetry (%)  99   04/15/19 09:00








GENERAL: The patient is awake, alert, and fully oriented, in NAD.


HEAD: Normal with no signs of trauma.


EYES: PERRL, extraocular movements intact, sclera anicteric, conjunctiva clear. 


ENT: Ears normal, oropharynx clear without exudates, moist mucous membranes.


NECK: Trachea midline, full range of motion, supple. 


LUNGS: Breath sounds equal, clear to auscultation bilaterally, no wheezes, no 

crackles, no accessory muscle use. 


HEART: Regular rate and rhythm, S1, S2 without murmur, rub or gallop.


ABDOMEN: Soft, nontender, nondistended, normoactive bowel sounds, no guarding, 

no rebound. 


EXTREMITIES: 2+ pulses, warm, well-perfused, mild swelling of right knee, 

improving. 


NEUROLOGICAL: Cranial nerves II through XII grossly intact. Normal speech, gait 

is steady, no tremors. 


PSYCH: Normal mood, normal affect.


SKIN: Warm, dry, normal turgor, no rashes or lesions noted.


CBCD











WBC  5.0 K/mm3 (4.0-10.0)   04/13/19  06:45    


 


RBC  3.62 M/mm3 (4.00-5.60)  L  04/13/19  06:45    


 


Hgb  10.9 GM/dL (11.7-16.9)  L  04/13/19  06:45    


 


Hct  33.1 % (35.4-49)  L  04/13/19  06:45    


 


MCV  91.4 fl (80-96)   04/13/19  06:45    


 


MCHC  32.9 g/dl (32.0-35.9)   04/13/19  06:45    


 


RDW  18.4 % (11.9-15.9)  H  04/13/19  06:45    


 


Plt Count  71 K/MM3 (134-434)  L D 04/13/19  06:45    


 


MPV  8.9 fl (7.5-11.1)  D 04/13/19  06:45    








 CMP











Sodium  137 mmol/L (136-145)   04/13/19  06:45    


 


Potassium  4.1 mmol/L (3.5-5.1)   04/13/19  06:45    


 


Chloride  106 mmol/L ()   04/13/19  06:45    


 


Carbon Dioxide  25 mmol/L (21-32)   04/13/19  06:45    


 


Anion Gap  7 MMOL/L (8-16)  L  04/13/19  06:45    


 


BUN  18 mg/dL (7-18)   04/13/19  06:45    


 


Creatinine  2.0 mg/dL (0.55-1.3)  H  04/13/19  06:45    


 


Creat Clearance w eGFR  33.10  (>60)   04/13/19  06:45    


 


Random Glucose  190 mg/dL ()  H  04/13/19  06:45    


 


Calcium  8.3 mg/dL (8.5-10.1)  L  04/13/19  06:45    


 


Total Bilirubin  0.6 mg/dL (0.2-1)   04/12/19  06:30    


 


AST  83 U/L (15-37)  H  04/12/19  06:30    


 


ALT  29 U/L (13-61)   04/12/19  06:30    


 


Alkaline Phosphatase  159 U/L ()  H  04/12/19  06:30    


 


Total Protein  6.5 g/dl (6.4-8.2)   04/12/19  06:30    


 


Albumin  3.0 g/dl (3.4-5.0)  L  04/12/19  06:30    








 Current Medications











Generic Name Dose Route Start Last Admin





  Trade Name Freq  PRN Reason Stop Dose Admin


 


Amlodipine Besylate  2.5 mg  04/12/19 10:00  04/15/19 09:52





  Norvasc -  PO   2.5 mg





  DAILY [facility]EL   Administration





     





     





     





     


 


Atorvastatin Calcium  20 mg  04/11/19 22:00  04/14/19 22:24





  Lipitor -  PO   20 mg





  HS [facility]   Administration





     





     





     





     


 


Chlordiazepoxide HCl  10 mg  04/16/19 05:00  





  Librium -  PO  04/17/19 05:00  





  Q12H PRN   





  Signs/symptoms of Withdrawal   





     





     





     


 


Chlordiazepoxide HCl  10 mg  04/14/19 14:35  





  Librium -  PO  04/16/19 05:00  





  Q8H PRN   





  Signs/symptoms of Withdrawal   





     





     





     


 


Chlordiazepoxide HCl  15 mg  04/15/19 14:00  04/15/19 13:48





  Librium -  PO  04/16/19 06:01  15 mg





  TID [facility]EL   Administration





     





     





     





     


 


Chlordiazepoxide HCl  10 mg  04/16/19 14:00  





  Librium -  PO  04/17/19 06:01  





  TID SAMUEL   





     





     





     





     


 


Erythromycin  1 applic  04/11/19 19:00  04/15/19 09:52





  Erythromycin 0.5% Eye Ointment  OD   1 applic





  DAILY [facility]   Administration





     





     





     





     


 


Folic Acid  1 mg  04/12/19 10:00  04/15/19 09:52





  Folic Acid -  PO   1 mg





  DAILY [facility]   Administration





     





     





     





     


 


Gabapentin  600 mg  04/12/19 10:00  04/15/19 09:52





  Neurontin -  PO   600 mg





  DAILY [facility]   Administration





     





     





     





     


 


Insulin Aspart  1 vial  04/11/19 16:30  04/15/19 11:35





  Novolog Vial Sliding Scale -  SQ   8 units





  [facility]   Administration





     





     





  Protocol   





     


 


Insulin Detemir  40 units  04/11/19 22:00  04/15/19 06:01





  Levemir Vial  SQ   Not Given





  BID@0700,2200 [facility]   





     





     





     





     


 


Metoprolol Succinate  50 mg  04/12/19 10:00  04/15/19 09:52





  Toprol Xl -  PO   50 mg





  DAILY [facility]   Administration





     





     





     





     


 


Pancrelipase  4 cap  04/12/19 08:00  04/15/19 12:20





  Creon Dr 6,000 Units Capsule  PO   4 cap





  TIDCM [facility]   Administration





     





     





     





     


 


Pantoprazole Sodium  40 mg  04/12/19 10:00  04/15/19 09:52





  Protonix -  PO   40 mg





  DAILY [facility]   Administration





     





     





     





     


 


Spironolactone  25 mg  04/12/19 10:00  04/15/19 09:52





  Aldactone -  PO   25 mg





  DAILY [facility]   Administration





     





     





     





     


 


Thiamine HCl  100 mg  04/12/19 10:00  04/15/19 12:16





  Vitamin B1 -  PO   100 mg





  DAILY [facility]   Administration





     





     





     





     


 


Zolpidem Tartrate  5 mg  04/12/19 20:58  04/14/19 22:27





  Ambien -  PO   5 mg





  HS PRN   Administration





  INSOMNIA   





     





     





     








 Home Medications











 Medication  Instructions  Recorded


 


Amlodipine Besylate 2.5 mg PO DAILY 04/10/19


 


Atorvastatin Ca [Lipitor] 20 mg PO HS 04/10/19


 


Esomeprazole Magnesium 40 mg PO DAILY 04/10/19


 


Gabapentin 600 mg PO DAILY 04/10/19


 


Insulin Degludec [Tresiba] 40 unit SQ BID 04/10/19


 


Lipase/Protease/Amylase [Creon Dr 5 each PO TID 04/10/19





24,000 Units Capsule]  


 


Liraglutide [Victoza -] 1.8 mg SQ DAILY@0700 04/10/19


 


Metoprolol Succinate [Toprol Xl] 50 mg PO DAILY 04/10/19


 


Spironolactone 25 mg PO DAILY 04/10/19


 


Zolpidem Tartrate [Ambien] 10 mg PO HS 04/10/19


 


Thiamine HCl [Vitamin B1 -] 100 mg PO DAILY  tablet 04/13/19

















CT of the chest : Oval shaped pleural based masslike density in the right 

midlung, laterally measuring 4x1.4cm in AP and transverse dimension compatible 

with Lipoma. Slightly displaced acute fracture in the posterior arch of the 

right 10th through 12th rib.





US of abdomen: Fatty Liver vs hepatocellular disease. mild splenomegaly, 

thickening of gallbladder wall mainly at the fundus with suggestion os small 

sludge.








ASSESSMENT AND PLAN:


Patient is a 73 year old male with hx of alcohol abuse, sleep apnea, non-

insulin dependent diabetes, HTN, alcohol withdrawal seizure (only one in the 

past, last seizure 3 years ago), and multiple detox admissions (2018). 





#Right knee pain with mild swelling improving: xray of the knee ; no acute 

pathology; mild focal prominence of prepatellar soft tissues and partial 

effacement of ft in Hoffa's fat pad could be postraumatic/or inflammatory. 

Ortho consult appreciated, follow up with ortho as an outpatient.  No 

antibiotics are needed at this time. 





#Alcohol Withdrawal ;comfortable today . will continue librium detox for one 

more day.





#Acute over chronic Kidney injury :Creatinine 2. 5--> 2.0--2.0 today, will 

repeat the bmp now, outpatient nephrologist name is Dr. Haile Del Cid at [phone]. patient will follow up with him. 





#Pancytopenia: due to liver disease due to etoh dependency, improving, stopped 

the AC px. 





#T2DM: continue home meds except the levemir.





#Hypertension: controlled now, continue current regimen. 





#Hyperlipidemia:on statin continue 





diabetic diet

## 2019-04-16 VITALS — SYSTOLIC BLOOD PRESSURE: 130 MMHG | TEMPERATURE: 98.2 F | DIASTOLIC BLOOD PRESSURE: 60 MMHG | HEART RATE: 71 BPM

## 2019-04-16 RX ADMIN — FOLIC ACID SCH MG: 1 TABLET ORAL at 09:54

## 2019-04-16 RX ADMIN — SPIRONOLACTONE SCH MG: 25 TABLET, FILM COATED ORAL at 09:54

## 2019-04-16 RX ADMIN — INSULIN DETEMIR SCH UNITS: 100 INJECTION, SOLUTION SUBCUTANEOUS at 06:29

## 2019-04-16 RX ADMIN — AMLODIPINE BESYLATE SCH MG: 2.5 TABLET ORAL at 09:54

## 2019-04-16 RX ADMIN — LIDOCAINE SCH PATCH: 50 PATCH TOPICAL at 09:55

## 2019-04-16 RX ADMIN — GABAPENTIN SCH MG: 300 CAPSULE ORAL at 09:54

## 2019-04-16 RX ADMIN — PANTOPRAZOLE SODIUM SCH MG: 40 TABLET, DELAYED RELEASE ORAL at 09:55

## 2019-04-16 RX ADMIN — INSULIN ASPART SCH UNITS: 100 INJECTION, SOLUTION INTRAVENOUS; SUBCUTANEOUS at 06:31

## 2019-04-16 RX ADMIN — PANCRELIPASE SCH CAP: 30000; 6000; 19000 CAPSULE, DELAYED RELEASE PELLETS ORAL at 09:53

## 2019-04-16 RX ADMIN — Medication SCH MG: at 09:55

## 2019-04-16 RX ADMIN — PANCRELIPASE SCH CAP: 30000; 6000; 19000 CAPSULE, DELAYED RELEASE PELLETS ORAL at 12:02

## 2019-04-16 RX ADMIN — ERYTHROMYCIN SCH APPLIC: 5 OINTMENT OPHTHALMIC at 09:59

## 2019-04-16 RX ADMIN — INSULIN ASPART SCH: 100 INJECTION, SOLUTION INTRAVENOUS; SUBCUTANEOUS at 12:02

## 2019-04-16 NOTE — PN
Teaching Attending Note


Name of Resident: Janette Monsivais





ATTENDING PHYSICIAN STATEMENT





I saw and evaluated the patient.


I reviewed the resident's note and discussed the case with the resident.


I agree with the resident's findings and plan as documented.








SUBJECTIVE:





Patient is lying in bed with no acute distress. wants to go to rehab.


OBJECTIVE:


 Vital Signs











Temperature  98.2 F   04/16/19 10:00


 


Pulse Rate  71   04/16/19 10:00


 


Respiratory Rate  19 04/16/19 10:00


 


Blood Pressure  130/60   04/16/19 10:00


 


O2 Sat by Pulse Oximetry (%)  98   04/16/19 09:00








GENERAL: The patient is awake, alert, and fully oriented, in NAD.


HEAD: Normal with no signs of trauma.


EYES: PERRL, extraocular movements intact, sclera anicteric, conjunctiva clear. 


ENT: Ears normal, oropharynx clear without exudates, moist mucous membranes.


NECK: Trachea midline, full range of motion, supple. 


LUNGS: Breath sounds equal, clear to auscultation bilaterally, no wheezes, no 

crackles, no accessory muscle use. 


HEART: Regular rate and rhythm, S1, S2 without murmur, rub or gallop.


ABDOMEN: Soft, nontender, nondistended, normoactive bowel sounds, no guarding, 

no rebound. 


EXTREMITIES: 2+ pulses, warm, well-perfused, mild swelling of right knee, 

improved. 


NEUROLOGICAL: Cranial nerves II through XII grossly intact. Normal speech, gait 

is steady, no tremors. 


PSYCH: Normal mood, normal affect.


SKIN: Warm, dry, normal turgor, no rashes or lesions noted.


 CBCD











WBC  5.0 K/mm3 (4.0-10.0)   04/13/19  06:45    


 


RBC  3.62 M/mm3 (4.00-5.60)  L  04/13/19  06:45    


 


Hgb  10.9 GM/dL (11.7-16.9)  L  04/13/19  06:45    


 


Hct  33.1 % (35.4-49)  L  04/13/19  06:45    


 


MCV  91.4 fl (80-96)   04/13/19  06:45    


 


MCHC  32.9 g/dl (32.0-35.9)   04/13/19  06:45    


 


RDW  18.4 % (11.9-15.9)  H  04/13/19  06:45    


 


Plt Count  71 K/MM3 (134-434)  L D 04/13/19  06:45    


 


MPV  8.9 fl (7.5-11.1)  D 04/13/19  06:45    








 CMP











Sodium  138 mmol/L (136-145)   04/15/19  14:45    


 


Potassium  4.2 mmol/L (3.5-5.1)   04/15/19  14:45    


 


Chloride  105 mmol/L ()   04/15/19  14:45    


 


Carbon Dioxide  28 mmol/L (21-32)   04/15/19  14:45    


 


Anion Gap  6 MMOL/L (8-16)  L  04/15/19  14:45    


 


BUN  22 mg/dL (7-18)  H  04/15/19  14:45    


 


Creatinine  2.2 mg/dL (0.55-1.3)  H  04/15/19  14:45    


 


Creat Clearance w eGFR  29.65  (>60)   04/15/19  14:45    


 


Random Glucose  254 mg/dL ()  H  04/15/19  14:45    


 


Calcium  8.1 mg/dL (8.5-10.1)  L  04/15/19  14:45    


 


Total Bilirubin  0.6 mg/dL (0.2-1)   04/12/19  06:30    


 


AST  83 U/L (15-37)  H  04/12/19  06:30    


 


ALT  29 U/L (13-61)   04/12/19  06:30    


 


Alkaline Phosphatase  159 U/L ()  H  04/12/19  06:30    


 


Total Protein  6.5 g/dl (6.4-8.2)   04/12/19  06:30    


 


Albumin  3.0 g/dl (3.4-5.0)  L  04/12/19  06:30    








 Home Medications











 Medication  Instructions  Recorded


 


Amlodipine Besylate 2.5 mg PO DAILY 04/10/19


 


Atorvastatin Ca [Lipitor] 20 mg PO HS 04/10/19


 


Esomeprazole Magnesium 40 mg PO DAILY 04/10/19


 


Gabapentin 600 mg PO DAILY 04/10/19


 


Insulin Degludec [Tresiba] 40 unit SQ BID 04/10/19


 


Lipase/Protease/Amylase [Creon Dr 5 each PO TID 04/10/19





24,000 Units Capsule]  


 


Liraglutide [Victoza -] 1.8 mg SQ DAILY@0700 04/10/19


 


Metoprolol Succinate [Toprol Xl] 50 mg PO DAILY 04/10/19


 


Spironolactone 25 mg PO DAILY 04/10/19


 


Zolpidem Tartrate [Ambien] 10 mg PO HS 04/10/19


 


Thiamine HCl [Vitamin B1 -] 100 mg PO DAILY  tablet 04/13/19








 











CT of the chest : Oval shaped pleural based masslike density in the right 

midlung, laterally measuring 4x1.4cm in AP and transverse dimension compatible 

with Lipoma. Slightly displaced acute fracture in the posterior arch of the 

right 10th through 12th rib.





US of abdomen: Fatty Liver vs hepatocellular disease. mild splenomegaly, 

thickening of gallbladder wall mainly at the fundus with suggestion of small 

sludge.








ASSESSMENT AND PLAN:


Patient is a 73 year old male with hx of alcohol abuse, sleep apnea, non-

insulin dependent diabetes, HTN, alcohol withdrawal seizure (only one in the 

past, last seizure 3 years ago), and multiple detox admissions (2018). 





#Alcohol Withdrawal completed the protocol . [facility]. s/p librium protocol. 





#Right knee pain with mild swelling improved s/p fall, patient has been wearing 

a knee brace/stabilizer, xray of the knee ; no acute pathology; mild focal 

prominence of prepatellar soft tissues and partial effacement of ft in Hoffa's 

fat pad could be postraumatic/or inflammatory. Ortho consult appreciated, 

follow up with ortho as an outpatient.  No antibiotics. Id seen the patient. 





#Acute over chronic Kidney injury :Creatinine 2. 5--> 2.0--2.0--2.2 today,  

outpatient nephrology, follow up with Dr. Haile De lCid at [phone]. patient 

will follow up with him. 





#Pancytopenia: due to liver disease due to etoh dependency, improving, stopped 

the AC px. 





#T2DM: continue home meds Victoza/tresiba. 





#Hypertension: controlled now, continue current regimen. 





#Hyperlipidemia:on statin continue 





diabetic diet continue


discharge patient to [facility].

## 2024-05-17 NOTE — PN
[facility] Progress Note (SOAP)


Subjective: 





73 year old  male patient referred for consultation or alcohol use , reports  

first age of  use 18 ,  with  binge- drinking and  intermittent periods  of 

sobriety, longest lasting up to 4 years, relapsing frequently , multiple detox 

and  rehab admissions , admitted most recently for withdrawal, currently on 

Ativan protocol, latest detox Dec 2018 , reports drinking 1-2 bottles of wine 

per day in the last 3 weeks, last alcohol use was 4 days ago , reports  

blackouts in the past, tremors at times, falls while  intoxicated most recently 

1 week ago fell on his knees, hit left elbow , per MR CXR + acute minimally 

displaced fractures ribs  10-12 th right . 


Pt is  poor historian and appears forgetful,  minimizing alcohol use, reports 

driving  after  drinking then retracts statement , family at bedside (w/ pt's 

permission) state he does not drive  if  intoxicated . 


The patient is followed by outpatient detox at [facility].  Denies 

any recent seizures.


PMHX  :  sleep apnea, DM2, HTN, alcohol withdrawal seizures, most recently 

several years ago . 








Active Medications





Amlodipine Besylate (Norvasc -)  2.5 mg PO DAILY [facility]


Atorvastatin Calcium (Lipitor -)  20 mg PO HS [facility]


Erythromycin (Erythromycin 0.5% Eye Ointment)  1 applic OD DAILY [facility]


Folic Acid (Folic Acid -)  1 mg PO DAILY [facility]


Gabapentin (Neurontin -)  600 mg PO DAILY [facility]


Insulin Aspart (Novolog Vial Sliding Scale -)  1 vial SQ [facility]; Protocol


   Last Admin: 04/11/19 17:40 Dose:  Not Given


Insulin Detemir (Levemir Vial)  40 units SQ BID@0700,2200 [facility]


Lorazepam (Ativan -)  0.5 mg PO Q4H PRN


   PRN Reason: Symptoms of Withdrawal


   Stop: 04/13/19 05:00


Lorazepam (Ativan -)  1 mg PO Q4H PRN


   PRN Reason: Symptoms of Withdrawal


   Stop: 04/12/19 05:00


Lorazepam (Ativan -)  0.5 mg PO Q6H [facility]


   Stop: 04/13/19 05:01


Lorazepam (Ativan -)  1 mg PO 0500,1100,1700,2300 [facility]


   Stop: 04/11/19 23:01


   Last Admin: 04/11/19 17:35 Dose:  1 mg


Metoprolol Succinate (Toprol Xl -)  50 mg PO DAILY [facility]


Non-Formulary Medication (Lipase/Protease/Amylase [Creon Dr 24,000 Units Capsule

])  1 each PO DAILY [facility]


Non-Formulary Medication (Tadalafil [Cialis])  20 mg PO DAILY [facility]


Pantoprazole Sodium (Protonix -)  40 mg PO DAILY [facility]


Spironolactone (Aldactone -)  25 mg PO DAILY [facility]


Thiamine HCl (Vitamin B1 -)  100 mg PO DAILY [facility]


Zolpidem Tartrate (Ambien -)  5 mg PO HS PRN


   PRN Reason: INSOMNIA





 


Objective: 


 carol  , ambulatory in room  


HEENT : NCAT EOMI , mucosae dry  


Resp : no distress noted  


Ext: no c/c/e/ , no tremors ,  OA deformities makenzie hands  , superficial abrasion 

right  elbow,  bilateral  knees 


Neuro : AAO x 3 , some  confusion noted - slow  mentation 





 CBC, BMP





 04/11/19 05:30 





 04/11/19 05:30 








 Vital Signs - 24 hr











  04/10/19 04/11/19 04/11/19





  23:57 09:51 13:42


 


Temperature 98.6 F 97.9 F 97.4 F L


 


Pulse Rate   73


 


Pulse Rate [ 91 H 105 H 





Radial]   


 


Respiratory 18 22 H 18





Rate   


 


Blood Pressure   133/68


 


Blood Pressure 150/62 157/92 





[Right Arm]   


 


O2 Sat by Pulse 95 99 99





Oximetry (%)   














  04/11/19





  18:21


 


Temperature 97.7 F


 


Pulse Rate 62


 


Pulse Rate [ 





Radial] 


 


Respiratory 18





Rate 


 


Blood Pressure 145/69


 


Blood Pressure 





[Right Arm] 


 


O2 Sat by Pulse 





Oximetry (%) 

















Assessment: 








alcohol dependence 


diabetic nephropathy 





Plan: 





continue lorazepam protocol, pt may benefit from inpatient rehab, he is 

ambivalent  about going to inpatient  rehab ,  to be addressed with  Social 

worker  in the morning . PAST SURGICAL HISTORY:  No significant past surgical history     No significant past surgical history